# Patient Record
Sex: FEMALE | Race: WHITE | NOT HISPANIC OR LATINO | Employment: OTHER | URBAN - METROPOLITAN AREA
[De-identification: names, ages, dates, MRNs, and addresses within clinical notes are randomized per-mention and may not be internally consistent; named-entity substitution may affect disease eponyms.]

---

## 2023-01-23 ENCOUNTER — TELEPHONE (OUTPATIENT)
Dept: UROLOGY | Facility: CLINIC | Age: 83
End: 2023-01-23
Payer: MEDICARE

## 2023-01-23 NOTE — TELEPHONE ENCOUNTER
I spoke with pt daughter and scheduled pt appt. I will put a appointment slip in the mail as well      ----- Message from Renetta Gautam sent at 1/23/2023  4:38 PM CST -----  Type:  Sooner Apoointment Request    Caller is requesting a sooner appointment.  Caller declined first available appointment listed below.  Caller will not accept being placed on the waitlist and is requesting a message be sent to doctor.  Name of Caller: pt's daughter Vanessa Fontana  When is the first available appointment?sched for 5/17   Symptoms: recurrent UTI   Would the patient rather a call back or a response via [facility]? CALL   Best Call Back Number:[phone]  Additional Information: HAS HAD 3 BACTERIAL INFECTION AND MEDICINES ARE NOT WORKING MORGANELLA - MORGANELLA- II, KLEBSIELLA PNEUMONIAE, ENTEROCOCCUS FAECALIF. SHE IS RESISTANCE TO ANTIBIOTICS NOW SINCE SHE IS RECURRENT.

## 2023-02-13 ENCOUNTER — OFFICE VISIT (OUTPATIENT)
Dept: UROLOGY | Facility: CLINIC | Age: 83
End: 2023-02-13
Payer: MEDICARE

## 2023-02-13 VITALS
SYSTOLIC BLOOD PRESSURE: 159 MMHG | HEART RATE: 61 BPM | BODY MASS INDEX: 29.88 KG/M2 | DIASTOLIC BLOOD PRESSURE: 78 MMHG | HEIGHT: 63 IN | WEIGHT: 168.63 LBS

## 2023-02-13 DIAGNOSIS — Z87.440 HISTORY OF RECURRENT CYSTITIS: ICD-10-CM

## 2023-02-13 DIAGNOSIS — N39.0 RECURRENT UTI (URINARY TRACT INFECTION): Primary | ICD-10-CM

## 2023-02-13 DIAGNOSIS — N39.0 COMPLICATED UTI (URINARY TRACT INFECTION): ICD-10-CM

## 2023-02-13 DIAGNOSIS — Z01.818 PRE-OP TESTING: ICD-10-CM

## 2023-02-13 LAB
BILIRUB UR QL STRIP: NEGATIVE
CLARITY UR REFRACT.AUTO: CLEAR
COLOR UR AUTO: YELLOW
GLUCOSE UR QL STRIP: NEGATIVE
HGB UR QL STRIP: NEGATIVE
KETONES UR QL STRIP: NEGATIVE
LEUKOCYTE ESTERASE UR QL STRIP: NEGATIVE
NITRITE UR QL STRIP: NEGATIVE
PH UR STRIP: 7 [PH] (ref 5–8)
PROT UR QL STRIP: NEGATIVE
SP GR UR STRIP: 1.01 (ref 1–1.03)
URN SPEC COLLECT METH UR: NORMAL

## 2023-02-13 PROCEDURE — 3078F PR MOST RECENT DIASTOLIC BLOOD PRESSURE < 80 MM HG: ICD-10-PCS | Mod: CPTII,S$GLB,, | Performed by: NURSE PRACTITIONER

## 2023-02-13 PROCEDURE — 3288F FALL RISK ASSESSMENT DOCD: CPT | Mod: CPTII,S$GLB,, | Performed by: NURSE PRACTITIONER

## 2023-02-13 PROCEDURE — 87086 URINE CULTURE/COLONY COUNT: CPT | Performed by: NURSE PRACTITIONER

## 2023-02-13 PROCEDURE — 99999 PR PBB SHADOW E&M-EST. PATIENT-LVL IV: CPT | Mod: PBBFAC,,, | Performed by: NURSE PRACTITIONER

## 2023-02-13 PROCEDURE — 1159F PR MEDICATION LIST DOCUMENTED IN MEDICAL RECORD: ICD-10-PCS | Mod: CPTII,S$GLB,, | Performed by: NURSE PRACTITIONER

## 2023-02-13 PROCEDURE — 3077F SYST BP >= 140 MM HG: CPT | Mod: CPTII,S$GLB,, | Performed by: NURSE PRACTITIONER

## 2023-02-13 PROCEDURE — 81003 URINALYSIS AUTO W/O SCOPE: CPT | Performed by: NURSE PRACTITIONER

## 2023-02-13 PROCEDURE — 3077F PR MOST RECENT SYSTOLIC BLOOD PRESSURE >= 140 MM HG: ICD-10-PCS | Mod: CPTII,S$GLB,, | Performed by: NURSE PRACTITIONER

## 2023-02-13 PROCEDURE — 99204 OFFICE O/P NEW MOD 45 MIN: CPT | Mod: S$GLB,,, | Performed by: NURSE PRACTITIONER

## 2023-02-13 PROCEDURE — 99204 PR OFFICE/OUTPT VISIT, NEW, LEVL IV, 45-59 MIN: ICD-10-PCS | Mod: S$GLB,,, | Performed by: NURSE PRACTITIONER

## 2023-02-13 PROCEDURE — 1125F PR PAIN SEVERITY QUANTIFIED, PAIN PRESENT: ICD-10-PCS | Mod: CPTII,S$GLB,, | Performed by: NURSE PRACTITIONER

## 2023-02-13 PROCEDURE — 1101F PT FALLS ASSESS-DOCD LE1/YR: CPT | Mod: CPTII,S$GLB,, | Performed by: NURSE PRACTITIONER

## 2023-02-13 PROCEDURE — 3288F PR FALLS RISK ASSESSMENT DOCUMENTED: ICD-10-PCS | Mod: CPTII,S$GLB,, | Performed by: NURSE PRACTITIONER

## 2023-02-13 PROCEDURE — 1101F PR PT FALLS ASSESS DOC 0-1 FALLS W/OUT INJ PAST YR: ICD-10-PCS | Mod: CPTII,S$GLB,, | Performed by: NURSE PRACTITIONER

## 2023-02-13 PROCEDURE — 1160F PR REVIEW ALL MEDS BY PRESCRIBER/CLIN PHARMACIST DOCUMENTED: ICD-10-PCS | Mod: CPTII,S$GLB,, | Performed by: NURSE PRACTITIONER

## 2023-02-13 PROCEDURE — 3078F DIAST BP <80 MM HG: CPT | Mod: CPTII,S$GLB,, | Performed by: NURSE PRACTITIONER

## 2023-02-13 PROCEDURE — 99999 PR PBB SHADOW E&M-EST. PATIENT-LVL IV: ICD-10-PCS | Mod: PBBFAC,,, | Performed by: NURSE PRACTITIONER

## 2023-02-13 PROCEDURE — 1160F RVW MEDS BY RX/DR IN RCRD: CPT | Mod: CPTII,S$GLB,, | Performed by: NURSE PRACTITIONER

## 2023-02-13 PROCEDURE — 1125F AMNT PAIN NOTED PAIN PRSNT: CPT | Mod: CPTII,S$GLB,, | Performed by: NURSE PRACTITIONER

## 2023-02-13 PROCEDURE — 1159F MED LIST DOCD IN RCRD: CPT | Mod: CPTII,S$GLB,, | Performed by: NURSE PRACTITIONER

## 2023-02-13 RX ORDER — OXYBUTYNIN CHLORIDE 15 MG/1
TABLET, EXTENDED RELEASE ORAL
COMMUNITY
End: 2023-02-17

## 2023-02-13 RX ORDER — CELECOXIB 100 MG/1
100 CAPSULE ORAL DAILY
COMMUNITY

## 2023-02-13 RX ORDER — LOSARTAN POTASSIUM 50 MG/1
100 TABLET ORAL DAILY
COMMUNITY
End: 2023-04-04 | Stop reason: SDUPTHER

## 2023-02-13 RX ORDER — METOPROLOL SUCCINATE 25 MG/1
25 TABLET, EXTENDED RELEASE ORAL 2 TIMES DAILY
COMMUNITY
End: 2023-03-13

## 2023-02-13 RX ORDER — ALBUTEROL SULFATE 90 UG/1
AEROSOL, METERED RESPIRATORY (INHALATION)
COMMUNITY
End: 2023-02-22

## 2023-02-13 RX ORDER — NITROFURANTOIN MACROCRYSTALS 50 MG/1
50 CAPSULE ORAL NIGHTLY
Status: ON HOLD | COMMUNITY
End: 2023-03-02 | Stop reason: HOSPADM

## 2023-02-13 RX ORDER — PANTOPRAZOLE SODIUM 40 MG/1
40 TABLET, DELAYED RELEASE ORAL DAILY
COMMUNITY

## 2023-02-13 NOTE — PATIENT INSTRUCTIONS
Schedule patient for cysto with possible bladder fulguration by Dr. Walton for history of recurrent UTIs.   Pre-op labs needed (u/a , urine cx, CBC, BMP).  Continue to avoid the use of blood thinners for 7-10 days prior to surgery to reduce risk of bleeding if bladder fulguration is needed.   U/S retroperitoneal complete (STAT)  Follow-up post-op.

## 2023-02-13 NOTE — PROGRESS NOTES
Subjective:       Patient ID: Ricky Hassan is a 82 y.o. female.    Chief Complaint: Urinary Tract Infection    Patient is new to me. She is a 83 yo WF who is here today for evaluation of recurrent UTIs. Patient reports having this problem for years, but it has worsen the last 3 months. She reports having a persistent UTI that is not responding to antibiotic therapy. Patient is currently taking fosfomycin (has taken two packs and has 1 pack left). She is also on maintenance antibiotic therapy (macrodantin 50 mg). Patient is here today with her daughter (Alexus).    Urinary Tract Infection   This is a chronic problem. The current episode started more than 1 month ago. The problem has been unchanged. The quality of the pain is described as aching (bladder pain). The pain is at a severity of 6/10. The pain is moderate. There has been no fever. There is No history of pyelonephritis. Associated symptoms include frequency. Pertinent negatives include no behavior changes, chills, discharge, flank pain, hematuria, hesitancy, nausea, possible pregnancy, sweats, urgency, vomiting, weight loss, bubble bath use or withholding. She has tried antibiotics for the symptoms. The treatment provided mild relief. Her past medical history is significant for hypertension and recurrent UTIs. There is no history of diabetes mellitus, kidney stones, a single kidney or a urological procedure.   Review of Systems   Constitutional:  Negative for chills, fatigue, fever and weight loss.   Gastrointestinal:  Negative for nausea and vomiting.   Genitourinary:  Positive for frequency, nocturia (x4) and pelvic pain (bladder pain). Negative for decreased urine volume, difficulty urinating, dysuria, flank pain, hematuria, hesitancy, urgency, vaginal bleeding, vaginal discharge and vaginal pain.   Neurological:  Negative for dizziness, weakness, light-headedness and headaches.   Psychiatric/Behavioral: Negative.         Objective:      Physical  Exam  Vitals and nursing note reviewed.   Constitutional:       Appearance: She is well-developed.   HENT:      Head: Normocephalic and atraumatic.   Eyes:      Pupils: Pupils are equal, round, and reactive to light.   Cardiovascular:      Rate and Rhythm: Normal rate.   Pulmonary:      Effort: Pulmonary effort is normal. No respiratory distress.   Abdominal:      Palpations: Abdomen is soft.      Tenderness: There is no abdominal tenderness.   Musculoskeletal:         General: Normal range of motion.      Cervical back: Normal range of motion.   Skin:     General: Skin is warm and dry.   Neurological:      Mental Status: She is alert and oriented to person, place, and time.      Coordination: Coordination normal.   Psychiatric:         Mood and Affect: Mood normal.         Behavior: Behavior normal.         Thought Content: Thought content normal.         Judgment: Judgment normal.       Assessment:       Problem List Items Addressed This Visit    None  Visit Diagnoses       Recurrent UTI (urinary tract infection)    -  Primary    Relevant Orders    US Retroperitoneal Complete    Urine culture    Urinalysis    Complicated UTI (urinary tract infection)        Relevant Orders    US Retroperitoneal Complete    Urine culture    Urinalysis    History of recurrent cystitis        Relevant Orders    US Retroperitoneal Complete    Urine culture    Urinalysis    Pre-op testing        Relevant Orders    CBC Auto Differential    Basic Metabolic Panel            Plan:           Ricky was seen today for urinary tract infection.    Diagnoses and all orders for this visit:    Recurrent UTI (urinary tract infection)  -     US Retroperitoneal Complete; Future  -     Urine culture  -     Urinalysis    Complicated UTI (urinary tract infection)  -     US Retroperitoneal Complete; Future  -     Urine culture  -     Urinalysis    History of recurrent cystitis  -     US Retroperitoneal Complete; Future  -     Urine culture  -      Urinalysis    Pre-op testing  -     CBC Auto Differential; Future  -     Basic Metabolic Panel; Future    Other orders  Schedule patient for cysto with possible bladder fulguration by Dr. Walton for history of recurrent UTIs.   Pre-op labs needed (u/a , urine cx, CBC, BMP).  Continue to avoid the use of blood thinners for 7-10 days prior to surgery to reduce risk of bleeding if bladder fulguration is needed.     Follow-up post-op.     Lisa Dorantes NP

## 2023-02-14 ENCOUNTER — TELEPHONE (OUTPATIENT)
Dept: UROLOGY | Facility: CLINIC | Age: 83
End: 2023-02-14
Payer: MEDICARE

## 2023-02-14 LAB — BACTERIA UR CULT: NO GROWTH

## 2023-02-14 NOTE — TELEPHONE ENCOUNTER
----- Message from Eliel Ivan sent at 2/14/2023 10:55 AM CST -----  .Type:  Procedure    Who Called: Pt  Would the patient rather a call back or a response via [facility]? Call  Best Call Back Number: [phone]  Additional Information:   Pt would like to March 2nd if possible, for procedure.

## 2023-02-16 DIAGNOSIS — Z87.440 HISTORY OF RECURRENT UTIS: ICD-10-CM

## 2023-02-16 DIAGNOSIS — Z87.440 HISTORY OF RECURRENT CYSTITIS: Primary | ICD-10-CM

## 2023-02-16 DIAGNOSIS — N39.0 RECURRENT UTI (URINARY TRACT INFECTION): ICD-10-CM

## 2023-02-16 RX ORDER — SODIUM CHLORIDE 9 MG/ML
INJECTION, SOLUTION INTRAVENOUS CONTINUOUS
Status: CANCELLED | OUTPATIENT
Start: 2023-02-16

## 2023-02-16 RX ORDER — LIDOCAINE HYDROCHLORIDE 20 MG/ML
JELLY TOPICAL ONCE
Status: CANCELLED | OUTPATIENT
Start: 2023-02-16 | End: 2023-02-16

## 2023-02-17 ENCOUNTER — TELEPHONE (OUTPATIENT)
Dept: UROLOGY | Facility: CLINIC | Age: 83
End: 2023-02-17
Payer: MEDICARE

## 2023-02-17 DIAGNOSIS — N20.0 LEFT RENAL STONE: Primary | ICD-10-CM

## 2023-02-17 RX ORDER — TAMSULOSIN HYDROCHLORIDE 0.4 MG/1
0.4 CAPSULE ORAL DAILY
Qty: 30 CAPSULE | Refills: 2 | Status: SHIPPED | OUTPATIENT
Start: 2023-02-17 | End: 2023-07-20

## 2023-02-17 NOTE — TELEPHONE ENCOUNTER
Spoke with pt daughter, informed of note.    ----- Message from Lisa Dorantes NP sent at 2/17/2023  3:49 PM CST -----  Please inform patient's daughter/caregiver that patient's urine cx was normal.

## 2023-02-17 NOTE — TELEPHONE ENCOUNTER
I spoke with daughter, informed.    ----- Message from Lisa Dorantes NP sent at 2/17/2023  3:55 PM CST -----  Please inform patient's daughter/caregiver via telephone that patient has a small non-obstructing left renal stone that was noted on her U/S. I would like patient to start trial of tamsulosin. Script sent to [facility].

## 2023-02-20 ENCOUNTER — TELEPHONE (OUTPATIENT)
Dept: UROLOGY | Facility: CLINIC | Age: 83
End: 2023-02-20
Payer: MEDICARE

## 2023-02-20 NOTE — TELEPHONE ENCOUNTER
Spoke to pts daughter she will call today to see if she can get an appointment this week for cardio clearance and echo and let us know if they will be able to fit them in or not this week.

## 2023-02-20 NOTE — TELEPHONE ENCOUNTER
Called [facility] [phone].I called and she hasn't been seen since march of 2023 I have them faxing the progress note for that to [facility]. That includes an EKG but there was no echo done that she can see. If pt needs clearance she will have to be seen by them prior to procedure.

## 2023-02-22 ENCOUNTER — TELEPHONE (OUTPATIENT)
Dept: CARDIOLOGY | Facility: CLINIC | Age: 83
End: 2023-02-22

## 2023-02-22 ENCOUNTER — OFFICE VISIT (OUTPATIENT)
Dept: CARDIOLOGY | Facility: CLINIC | Age: 83
End: 2023-02-22
Payer: MEDICARE

## 2023-02-22 ENCOUNTER — HOSPITAL ENCOUNTER (OUTPATIENT)
Dept: PULMONOLOGY | Facility: HOSPITAL | Age: 83
Discharge: HOME | End: 2023-02-22
Attending: INTERNAL MEDICINE
Payer: MEDICARE

## 2023-02-22 VITALS
SYSTOLIC BLOOD PRESSURE: 122 MMHG | DIASTOLIC BLOOD PRESSURE: 68 MMHG | BODY MASS INDEX: 29.61 KG/M2 | RESPIRATION RATE: 14 BRPM | HEART RATE: 58 BPM | WEIGHT: 167.13 LBS | OXYGEN SATURATION: 98 % | HEIGHT: 63 IN

## 2023-02-22 DIAGNOSIS — E78.5 HYPERLIPIDEMIA, UNSPECIFIED HYPERLIPIDEMIA TYPE: ICD-10-CM

## 2023-02-22 DIAGNOSIS — R01.1 UNDIAGNOSED CARDIAC MURMURS: Primary | ICD-10-CM

## 2023-02-22 DIAGNOSIS — I10 PRIMARY HYPERTENSION: ICD-10-CM

## 2023-02-22 DIAGNOSIS — R94.31 ABNORMAL ELECTROCARDIOGRAM: ICD-10-CM

## 2023-02-22 DIAGNOSIS — I35.1 AORTIC EJECTION MURMUR: ICD-10-CM

## 2023-02-22 DIAGNOSIS — I45.10 RBBB: ICD-10-CM

## 2023-02-22 DIAGNOSIS — Z01.818 PREOPERATIVE CLEARANCE: ICD-10-CM

## 2023-02-22 DIAGNOSIS — R01.1 UNDIAGNOSED CARDIAC MURMURS: ICD-10-CM

## 2023-02-22 LAB
ASCENDING AORTA: 2.61 CM
AV INDEX (PROSTH): 0.91
AV MEAN GRADIENT: 5 MMHG
AV PEAK GRADIENT: 8 MMHG
AV VALVE AREA: 1.98 CM2
AV VELOCITY RATIO: 0.88
BSA FOR ECHO PROCEDURE: 1.84 M2
CV ECHO LV RWT: 0.47 CM
DOP CALC AO PEAK VEL: 1.45 M/S
DOP CALC AO VTI: 35.3 CM
DOP CALC LVOT AREA: 2.2 CM2
DOP CALC LVOT DIAMETER: 1.67 CM
DOP CALC LVOT PEAK VEL: 1.28 M/S
DOP CALC LVOT STROKE VOLUME: 70.06 CM3
DOP CALC RVOT PEAK VEL: 1.05 M/S
DOP CALC RVOT VTI: 23.6 CM
DOP CALCLVOT PEAK VEL VTI: 32 CM
E WAVE DECELERATION TIME: 298.93 MSEC
E/A RATIO: 0.69
E/E' RATIO: 14.8 M/S
ECHO LV POSTERIOR WALL: 1.07 CM (ref 0.6–1.1)
EJECTION FRACTION: 60 %
FRACTIONAL SHORTENING: 36 % (ref 28–44)
INTERVENTRICULAR SEPTUM: 1.04 CM (ref 0.6–1.1)
IVC DIAMETER: 14 CM
IVRT: 133.21 MSEC
LA MAJOR: 4.92 CM
LA MINOR: 5.02 CM
LA WIDTH: 2.6 CM
LEFT ATRIUM SIZE: 3.15 CM
LEFT ATRIUM VOLUME INDEX MOD: 21.2 ML/M2
LEFT ATRIUM VOLUME INDEX: 19.3 ML/M2
LEFT ATRIUM VOLUME MOD: 37.98 CM3
LEFT ATRIUM VOLUME: 34.6 CM3
LEFT INTERNAL DIMENSION IN SYSTOLE: 2.91 CM (ref 2.1–4)
LEFT VENTRICLE DIASTOLIC VOLUME INDEX: 52.09 ML/M2
LEFT VENTRICLE DIASTOLIC VOLUME: 93.24 ML
LEFT VENTRICLE MASS INDEX: 93 G/M2
LEFT VENTRICLE SYSTOLIC VOLUME INDEX: 18.2 ML/M2
LEFT VENTRICLE SYSTOLIC VOLUME: 32.57 ML
LEFT VENTRICULAR INTERNAL DIMENSION IN DIASTOLE: 4.52 CM (ref 3.5–6)
LEFT VENTRICULAR MASS: 166.24 G
LV LATERAL E/E' RATIO: 14.8 M/S
LV SEPTAL E/E' RATIO: 14.8 M/S
LVOT MG: 4.05 MMHG
LVOT MV: 0.97 CM/S
MV PEAK A VEL: 1.07 M/S
MV PEAK E VEL: 0.74 M/S
MV STENOSIS PRESSURE HALF TIME: 86.69 MS
MV VALVE AREA P 1/2 METHOD: 2.54 CM2
PISA TR MAX VEL: 1.9 M/S
PULM VEIN S/D RATIO: 1.65
PV MEAN GRADIENT: 2.75 MMHG
PV MV: 0.89 M/S
PV PEAK D VEL: 0.26 M/S
PV PEAK S VEL: 0.43 M/S
PV PEAK VELOCITY: 1.15 CM/S
RA MAJOR: 4.52 CM
RA PRESSURE: 8 MMHG
RA WIDTH: 3.35 CM
RIGHT VENTRICULAR END-DIASTOLIC DIMENSION: 2.82 CM
SINUS: 2.73 CM
STJ: 2.69 CM
TDI LATERAL: 0.05 M/S
TDI SEPTAL: 0.05 M/S
TDI: 0.05 M/S
TR MAX PG: 14 MMHG
TV REST PULMONARY ARTERY PRESSURE: 22 MMHG

## 2023-02-22 PROCEDURE — 3078F DIAST BP <80 MM HG: CPT | Mod: CPTII,S$GLB,, | Performed by: INTERNAL MEDICINE

## 2023-02-22 PROCEDURE — 99999 PR PBB SHADOW E&M-EST. PATIENT-LVL III: CPT | Mod: PBBFAC,,, | Performed by: INTERNAL MEDICINE

## 2023-02-22 PROCEDURE — 93306 ECHO (CUPID ONLY): ICD-10-PCS | Mod: 26,,, | Performed by: INTERNAL MEDICINE

## 2023-02-22 PROCEDURE — 99204 OFFICE O/P NEW MOD 45 MIN: CPT | Mod: 25,S$GLB,, | Performed by: INTERNAL MEDICINE

## 2023-02-22 PROCEDURE — 1126F AMNT PAIN NOTED NONE PRSNT: CPT | Mod: CPTII,S$GLB,, | Performed by: INTERNAL MEDICINE

## 2023-02-22 PROCEDURE — 3078F PR MOST RECENT DIASTOLIC BLOOD PRESSURE < 80 MM HG: ICD-10-PCS | Mod: CPTII,S$GLB,, | Performed by: INTERNAL MEDICINE

## 2023-02-22 PROCEDURE — 93005 ELECTROCARDIOGRAM TRACING: CPT

## 2023-02-22 PROCEDURE — 93306 TTE W/DOPPLER COMPLETE: CPT | Mod: 26,,, | Performed by: INTERNAL MEDICINE

## 2023-02-22 PROCEDURE — 93010 EKG 12-LEAD: ICD-10-PCS | Mod: S$GLB,,, | Performed by: INTERNAL MEDICINE

## 2023-02-22 PROCEDURE — 1126F PR PAIN SEVERITY QUANTIFIED, NO PAIN PRESENT: ICD-10-PCS | Mod: CPTII,S$GLB,, | Performed by: INTERNAL MEDICINE

## 2023-02-22 PROCEDURE — 93306 TTE W/DOPPLER COMPLETE: CPT

## 2023-02-22 PROCEDURE — 3074F PR MOST RECENT SYSTOLIC BLOOD PRESSURE < 130 MM HG: ICD-10-PCS | Mod: CPTII,S$GLB,, | Performed by: INTERNAL MEDICINE

## 2023-02-22 PROCEDURE — 99204 PR OFFICE/OUTPT VISIT, NEW, LEVL IV, 45-59 MIN: ICD-10-PCS | Mod: 25,S$GLB,, | Performed by: INTERNAL MEDICINE

## 2023-02-22 PROCEDURE — 93010 ELECTROCARDIOGRAM REPORT: CPT | Mod: S$GLB,,, | Performed by: INTERNAL MEDICINE

## 2023-02-22 PROCEDURE — 99999 PR PBB SHADOW E&M-EST. PATIENT-LVL III: ICD-10-PCS | Mod: PBBFAC,,, | Performed by: INTERNAL MEDICINE

## 2023-02-22 PROCEDURE — 3074F SYST BP LT 130 MM HG: CPT | Mod: CPTII,S$GLB,, | Performed by: INTERNAL MEDICINE

## 2023-02-22 NOTE — PROGRESS NOTES
[facility]     Subjective:    Patient ID:  Ricky Hassan is a 82 y.o. female who presents for evaluation of Pre-op Exam (Referral from Dr Reed Walton), Hypertension, Abnormal ECG, and Hyperlipidemia    Review of patient's allergies indicates:   Allergen Reactions    Cipro [ciprofloxacin hcl] Itching    Vicodin [hydrocodone-acetaminophen] Hives      She is here to establish care.  She needs preoperative clearance for upcoming cystoscopy and management of possible kidney stone.  She has had recurrent urinary tract infections repeatedly in the recent past requiring parenteral and oral antibiotics.  She is never had sepsis.  Her kidney ultrasound study showed possible kidney stone.  A cystoscopy is planned.    She has had bilateral knee replacements in the past.  Her last nuclear stress test was normal, 2020 by report only.  She has hypertension and hyperlipidemia.  She is not on statin therapy due to intolerance.  Her blood pressures have been controlled with metoprolol and losartan.  She followed with [facility], her cardiologist left the group.  Have been able to review a note from March 2021.    Patient has right bundle branch block and left anterior fascicular block on Electrocardiogram.  Her heart rate today is 56 beats per minute.  She does not check her blood pressures at home.  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  She does not do a lot of exercise on a regular basis but denies shortness of breath or chest pain.  She tolerates metoprolol well and has been on it for years.  Her surgical procedure is March 2, 2023.    The patient's electrocardiogram reviewed and independently interpreted by myself today confirms heart rate 56 with right bundle branch block and findings of left anterior fascicular block.  She had recent chemistries and CBC which I have reviewed and look acceptably normal.      Review of Systems   Constitutional:  Negative for chills, decreased appetite, diaphoresis, fever, malaise/fatigue, night sweats, weight gain and weight loss.   HENT:  Negative for congestion, ear discharge, ear pain, hearing loss, hoarse voice, nosebleeds, odynophagia, sore throat, stridor and tinnitus.    Eyes:  Negative for blurred vision, discharge, double vision, pain, photophobia, redness, vision loss in left eye, vision loss in right eye, visual disturbance and visual halos.   Cardiovascular:  Negative for chest pain, claudication, cyanosis, dyspnea on exertion, irregular heartbeat, leg swelling, near-syncope, orthopnea, palpitations, paroxysmal nocturnal dyspnea and syncope.   Respiratory:  Negative for cough, hemoptysis, shortness of breath, sleep disturbances due to breathing, snoring, sputum production and wheezing.    Endocrine: Negative for cold intolerance, heat intolerance, polydipsia, polyphagia and polyuria.   Hematologic/Lymphatic: Negative for adenopathy and bleeding problem. Does not bruise/bleed easily.   Skin:  Negative for color change, dry skin, flushing, itching, nail changes, poor wound healing, rash, skin cancer, suspicious lesions and unusual hair distribution.   Musculoskeletal:  Negative for arthritis, back pain, falls, gout, joint pain, joint swelling, muscle cramps, muscle weakness, myalgias, neck pain and stiffness.   Gastrointestinal:  Negative for bloating, abdominal pain, anorexia, change in bowel habit, bowel incontinence, constipation, diarrhea, dysphagia, excessive appetite, flatus, heartburn, hematemesis, hematochezia, hemorrhoids, jaundice, melena, nausea and vomiting.   Genitourinary:  Negative for bladder incontinence, decreased libido, dysuria, flank pain, frequency, genital sores, hematuria, hesitancy, incomplete emptying, nocturia and urgency.   Neurological:  Negative for aphonia, brief paralysis, difficulty with concentration, disturbances in coordination, excessive daytime sleepiness, dizziness, focal  "weakness, headaches, light-headedness, loss of balance, numbness, paresthesias, seizures, sensory change, tremors, vertigo and weakness.   Psychiatric/Behavioral:  Negative for altered mental status, depression, hallucinations, memory loss, substance abuse, suicidal ideas and thoughts of violence. The patient does not have insomnia and is not nervous/anxious.    Allergic/Immunologic: Negative for hives and persistent infections.      Objective:       Vitals:    02/22/23 1024   BP: 122/68   BP Location: Right arm   Patient Position: Sitting   BP Method: Medium (Manual)   Pulse: (!) 58   Resp: 14   SpO2: 98%   Weight: 75.8 kg (167 lb 1.7 oz)   Height: 5' 3" (1.6 m)    Physical Exam  Constitutional:       General: She is not in acute distress.     Appearance: She is well-developed. She is not diaphoretic.   HENT:      Head: Normocephalic and atraumatic.      Nose: Nose normal.   Eyes:      General: No scleral icterus.        Right eye: No discharge.      Conjunctiva/sclera: Conjunctivae normal.      Pupils: Pupils are equal, round, and reactive to light.   Neck:      Thyroid: No thyromegaly.      Vascular: No JVD.      Trachea: No tracheal deviation.   Cardiovascular:      Rate and Rhythm: Normal rate and regular rhythm.      Pulses:           Carotid pulses are 2+ on the right side and 2+ on the left side.       Radial pulses are 2+ on the right side and 2+ on the left side.        Dorsalis pedis pulses are 2+ on the right side and 2+ on the left side.        Posterior tibial pulses are 2+ on the right side and 2+ on the left side.      Heart sounds: Murmur heard.   Harsh early systolic murmur is present with a grade of 2/6 at the upper right sternal border.     No friction rub. No gallop.   Pulmonary:      Effort: Pulmonary effort is normal. No respiratory distress.      Breath sounds: Normal breath sounds. No stridor. No wheezing or rales.   Chest:      Chest wall: No tenderness.   Abdominal:      General: Bowel " sounds are normal. There is no distension.      Palpations: Abdomen is soft. There is no mass.      Tenderness: There is no abdominal tenderness. There is no guarding or rebound.   Musculoskeletal:         General: No tenderness. Normal range of motion.      Cervical back: Normal range of motion and neck supple.   Lymphadenopathy:      Cervical: No cervical adenopathy.   Skin:     General: Skin is warm and dry.      Coloration: Skin is not pale.      Findings: No erythema or rash.   Neurological:      Mental Status: She is alert and oriented to person, place, and time.      Cranial Nerves: No cranial nerve deficit.      Coordination: Coordination normal.   Psychiatric:         Behavior: Behavior normal.         Thought Content: Thought content normal.         Judgment: Judgment normal.         Assessment:       1. Undiagnosed cardiac murmurs    2. RBBB    3. Hyperlipidemia, unspecified hyperlipidemia type    4. Primary hypertension    5. Abnormal electrocardiogram    6. Preoperative clearance    7. Aortic ejection murmur      Results for orders placed or performed in visit on 02/13/23   CBC Auto Differential   Result Value Ref Range    WBC 7.85 3.90 - 12.70 K/uL    RBC 4.64 4.00 - 5.40 M/uL    Hemoglobin 14.2 12.0 - 16.0 g/dL    Hematocrit 42.1 37.0 - 48.5 %    MCV 91 82 - 98 fL    MCH 30.6 27.0 - 31.0 pg    MCHC 33.7 32.0 - 36.0 g/dL    RDW 13.6 11.5 - 14.5 %    Platelets 278 150 - 450 K/uL    MPV 11.1 9.2 - 12.9 fL    Immature Granulocytes 0.1 0.0 - 0.5 %    Gran # (ANC) 5.0 1.8 - 7.7 K/uL    Immature Grans (Abs) 0.01 0.00 - 0.04 K/uL    Lymph # 1.9 1.0 - 4.8 K/uL    Mono # 0.6 0.3 - 1.0 K/uL    Eos # 0.3 0.0 - 0.5 K/uL    Baso # 0.08 0.00 - 0.20 K/uL    nRBC 0 0 /100 WBC    Gran % 63.6 38.0 - 73.0 %    Lymph % 24.1 18.0 - 48.0 %    Mono % 8.0 4.0 - 15.0 %    Eosinophil % 3.2 0.0 - 8.0 %    Basophil % 1.0 0.0 - 1.9 %    Differential Method Automated    Basic Metabolic Panel   Result Value Ref Range    Sodium 139  136 - 145 mmol/L    Potassium 4.3 3.5 - 5.1 mmol/L    Chloride 103 95 - 110 mmol/L    CO2 29 23 - 29 mmol/L    Glucose 97 70 - 110 mg/dL    BUN 19 (H) 7 - 17 mg/dL    Creatinine 0.73 0.50 - 1.40 mg/dL    Calcium 9.2 8.7 - 10.5 mg/dL    Anion Gap 7 (L) 8 - 16 mmol/L    eGFR >60.0 >60 mL/min/1.73 m^2         Current Outpatient Medications:     celecoxib (CELEBREX) 100 MG capsule, Take 100 mg by mouth once daily., Disp: , Rfl:     losartan (COZAAR) 50 MG tablet, Take 50 mg by mouth once daily., Disp: , Rfl:     metoprolol succinate (TOPROL-XL) 25 MG 24 hr tablet, Take 25 mg by mouth 2 (two) times daily., Disp: , Rfl:     nitrofurantoin (MACRODANTIN) 50 MG capsule, Take 50 mg by mouth every evening., Disp: , Rfl:     pantoprazole (PROTONIX) 40 MG tablet, Take 40 mg by mouth once daily., Disp: , Rfl:     tamsulosin (FLOMAX) 0.4 mg Cap, Take 1 capsule (0.4 mg total) by mouth once daily., Disp: 30 capsule, Rfl: 2     Lab Results   Component Value Date    WBC 7.85 02/13/2023    RBC 4.64 02/13/2023    HGB 14.2 02/13/2023    HCT 42.1 02/13/2023    MCV 91 02/13/2023    MCH 30.6 02/13/2023    MCHC 33.7 02/13/2023    RDW 13.6 02/13/2023     02/13/2023    MPV 11.1 02/13/2023    GRAN 5.0 02/13/2023    GRAN 63.6 02/13/2023    LYMPH 1.9 02/13/2023    LYMPH 24.1 02/13/2023    MONO 0.6 02/13/2023    MONO 8.0 02/13/2023    EOS 0.3 02/13/2023    BASO 0.08 02/13/2023    EOSINOPHIL 3.2 02/13/2023    BASOPHIL 1.0 02/13/2023        CMP  Lab Results   Component Value Date     02/13/2023    K 4.3 02/13/2023     02/13/2023    CO2 29 02/13/2023    GLU 97 02/13/2023    BUN 19 (H) 02/13/2023    CREATININE 0.73 02/13/2023    CALCIUM 9.2 02/13/2023    ANIONGAP 7 (L) 02/13/2023        Lab Results   Component Value Date    LABURIN No growth 02/13/2023            Results for orders placed or performed in visit on 02/22/23   EKG 12-lead    Collection Time: 02/22/23 10:18 AM    Narrative    Test Reason : R01.1,I45.10,    Vent. Rate :  056 BPM     Atrial Rate : 056 BPM     P-R Int : 194 ms          QRS Dur : 134 ms      QT Int : 428 ms       P-R-T Axes : 038 -57 019 degrees     QTc Int : 413 ms    Sinus bradycardia  Right bundle branch block  Left anterior fascicular block   Bifascicular block   Abnormal ECG  No previous ECGs available  Confirmed by Edin AGUILAR, Remberto HOOD ([id number]) on 2/22/2023 1:36:22 PM    Referred By: [email or web address]   SELF           Confirmed By:Remberto Jesus MD                  Plan:       Problem List Items Addressed This Visit          Cardiac/Vascular    Hyperlipidemia     She was intolerant to a statin in the past with leg weakness.  No labs available for review currently.  It will be readdressed on next visit.    Given her clinical status and age, it is unlikely that statin therapy will be initiated.         Primary hypertension     She will continue losartan, metoprolol.  Her readings have been stable.  Her ejection fraction is normal by echo with LVH.         Abnormal electrocardiogram     Right bundle branch block and left anterior fascicular block confirmed.  She has mild sinus bradycardia related to metoprolol.  No medication changes advised preoperatively.         Aortic ejection murmur     Echo reviewed, in significant aortic valve disease.  Her aortic valve demonstrates normal leaflet excursion.            Other    Preoperative clearance     I have cleared the patient for upcoming urologic surgical procedure.  She has normal ejection fraction on echo.  He had a negative stress test in 2020 by report.  She has no complaints of shortness of breath or angina.          Other Visit Diagnoses       Undiagnosed cardiac murmurs    -  Primary    Relevant Orders    Echo (Completed)    EKG 12-lead (Completed)    RBBB        Relevant Orders    EKG 12-lead (Completed)                 A 1 month follow-up was advised.  She has a heart murmur, echocardiogram ordered.  We discussed withdrawal of beta-blocker therapy in the future given her  age and conduction disease on Electrocardiogram      Her echocardiogram has been reviewed and I have provided surgical clearance from a cardiac standpoint.    Thank you for allowing me to participate in your patient's care.             Remberto Jesus MD  02/22/2023   11:15 AM

## 2023-02-22 NOTE — ASSESSMENT & PLAN NOTE
She will continue losartan, metoprolol.  Her readings have been stable.  Her ejection fraction is normal by echo with LVH.

## 2023-02-22 NOTE — ASSESSMENT & PLAN NOTE
I have cleared the patient for upcoming urologic surgical procedure.  She has normal ejection fraction on echo.  He had a negative stress test in 2020 by report.  She has no complaints of shortness of breath or angina.

## 2023-02-22 NOTE — ASSESSMENT & PLAN NOTE
Echo reviewed, in significant aortic valve disease.  Her aortic valve demonstrates normal leaflet excursion.

## 2023-02-22 NOTE — ASSESSMENT & PLAN NOTE
Right bundle branch block and left anterior fascicular block confirmed.  She has mild sinus bradycardia related to metoprolol.  No medication changes advised preoperatively.

## 2023-02-22 NOTE — ASSESSMENT & PLAN NOTE
She was intolerant to a statin in the past with leg weakness.  No labs available for review currently.  It will be readdressed on next visit.    Given her clinical status and age, it is unlikely that statin therapy will be initiated.

## 2023-03-02 PROBLEM — Z87.440 HISTORY OF RECURRENT CYSTITIS: Status: ACTIVE | Noted: 2023-03-02

## 2023-03-02 PROBLEM — N39.0 RECURRENT UTI (URINARY TRACT INFECTION): Status: ACTIVE | Noted: 2023-03-02

## 2023-03-02 PROBLEM — Z87.440 HISTORY OF RECURRENT UTIS: Status: ACTIVE | Noted: 2023-03-02

## 2023-03-03 ENCOUNTER — TELEPHONE (OUTPATIENT)
Dept: UROLOGY | Facility: CLINIC | Age: 83
End: 2023-03-03
Payer: MEDICARE

## 2023-03-03 RX ORDER — CEPHALEXIN 500 MG/1
500 CAPSULE ORAL 4 TIMES DAILY
Qty: 56 CAPSULE | Refills: 1 | Status: SHIPPED | OUTPATIENT
Start: 2023-03-03 | End: 2023-03-17

## 2023-03-03 NOTE — TELEPHONE ENCOUNTER
I spoke with pt and informed her to stop the bactrim and take benadryl tonight per Dr Walton and he will send her a new antibiotic to her pharmacy to start tomorrow.

## 2023-03-03 NOTE — TELEPHONE ENCOUNTER
----- Message from Carmina Michele sent at 3/3/2023 11:29 AM CST -----  Regarding: call back  Contact: [phone] or [phone]  Type:  Needs Medical Advice    Who Called: PT   Symptoms (please be specific): Hives or Rash   How long has patient had these symptoms:  this morning   Pharmacy name and phone #:  [id number] - [place], [place] - [address] Phone:[phone] Fax:[phone]  Would the patient rather a call back or a response via [facility]? Call back   Best Call Back Number:  [phone]  Additional Information:

## 2023-03-03 NOTE — TELEPHONE ENCOUNTER
Staff message sent to Magui:    Pt states one out of the three medications is causing her to break out in a rash all over her arms and legs, she doesn't know which one. She called the pharmacy and they directed her to call us. How would you like me to advise?      ----- Message from Florence Sheriff RN sent at 3/3/2023  1:31 PM CST -----  Regarding: call back patient  During Mrs. Hassan post op call she states that she had broken out in a rash after taking her medications and is requesting a call back from the office or Dr. Walton. Here is her main location she said for now until [facility] [phone].     Alt numbers: [phone]    Thank you,   Florence Sheriff Rn

## 2023-03-07 ENCOUNTER — HOSPITAL ENCOUNTER (EMERGENCY)
Facility: HOSPITAL | Age: 83
Discharge: HOME | End: 2023-03-07
Attending: SURGERY
Payer: MEDICARE

## 2023-03-07 DIAGNOSIS — K59.00 CONSTIPATION, UNSPECIFIED CONSTIPATION TYPE: Primary | ICD-10-CM

## 2023-03-07 PROCEDURE — 99282 EMERGENCY DEPT VISIT SF MDM: CPT

## 2023-03-07 RX ORDER — POLYETHYLENE GLYCOL 3350 17 G/17G
17 POWDER, FOR SOLUTION ORAL DAILY
Qty: 500 EACH | Refills: 0 | Status: SHIPPED | OUTPATIENT
Start: 2023-03-07 | End: 2023-03-13

## 2023-03-08 VITALS
RESPIRATION RATE: 17 BRPM | HEART RATE: 69 BPM | SYSTOLIC BLOOD PRESSURE: 188 MMHG | DIASTOLIC BLOOD PRESSURE: 82 MMHG | TEMPERATURE: 97 F | BODY MASS INDEX: 29.49 KG/M2 | OXYGEN SATURATION: 98 % | HEIGHT: 63 IN | WEIGHT: 166.44 LBS

## 2023-03-08 NOTE — ED PROVIDER NOTES
Encounter Date: 3/7/2023       History     Chief Complaint   Patient presents with    Abdominal Pain    Constipation     Pt c/c of constipation and abd pain for a week.. Pt had bladder surgery a week ago, and has not been able to have a BM since the surgery.  Pt has taken stool softeners and an enema  prior to arrival.  Pt reports N/V starting today.      Ricky Hassan is a 82 y.o. female presents with constipation this past week  Patient with bladder surgery with constipation afterwards; enema earlier tonight  Patient on exam has no signs of peritonitis rebound on evaluation this evening  Digital rectal exam shows a large impaction in the rectal vault, no bleeding now  Will need disimpaction, patient agrees to disimpaction & enema in the ER today    Review of patient's allergies indicates:   Allergen Reactions    Cipro [ciprofloxacin hcl] Itching    Vicodin [hydrocodone-acetaminophen] Hives    Adhesive Dermatitis     Long term adhesive sensitivity    Sulfa (sulfonamide antibiotics) Rash     Past Medical History:   Diagnosis Date    Arthritis     Digestive disorder     Hyperlipidemia     Hypertension      Past Surgical History:   Procedure Laterality Date    BLADDER FULGURATION N/A 3/2/2023    Procedure: FULGURATION, BLADDER;  Surgeon: Reed Walton MD;  Location: [facility] OR;  Service: Urology;  Laterality: N/A;    CATARACT EXTRACTION Bilateral     CYSTOSCOPY W/ RETROGRADES Bilateral 3/2/2023    Procedure: CYSTOSCOPY, WITH RETROGRADE PYELOGRAM;  Surgeon: Reed Walton MD;  Location: [facility] OR;  Service: Urology;  Laterality: Bilateral;  cysto with bilateral retrogrades with possible bladder fulguration    HYSTERECTOMY      KNEE ARTHROSCOPY Bilateral      No family history on file.  Social History     Tobacco Use    Smoking status: Never    Smokeless tobacco: Never   Substance Use Topics    Alcohol use: Never    Drug use: Never     Review of Systems   Constitutional: Negative.    HENT: Negative.     Eyes:  Negative.    Respiratory: Negative.     Cardiovascular: Negative.    Gastrointestinal:  Positive for constipation.   Genitourinary: Negative.    Musculoskeletal: Negative.    Skin: Negative.    Neurological: Negative.    Psychiatric/Behavioral: Negative.       Physical Exam     Initial Vitals   BP Pulse Resp Temp SpO2   23   (!) 195/81 72 18 96.5 °F (35.8 °C) 97 %      MAP       --                Physical Exam    Nursing note and vitals reviewed.  Constitutional: Vital signs are normal. She appears well-developed and well-nourished. She is cooperative.   HENT:   Head: Normocephalic and atraumatic.   Right Ear: External ear normal.   Left Ear: External ear normal.   Nose: Nose normal.   Mouth/Throat: Oropharynx is clear and moist.   Eyes: Conjunctivae, EOM and lids are normal. Pupils are equal, round, and reactive to light.   Neck: Trachea normal and phonation normal. Neck supple. No JVD present.   Normal range of motion.   Full passive range of motion without pain.     Cardiovascular:  Normal rate, regular rhythm, S1 normal, S2 normal, normal heart sounds, intact distal pulses and normal pulses.           Pulmonary/Chest: Effort normal and breath sounds normal.   Abdominal: Abdomen is soft and flat. Bowel sounds are normal.   Musculoskeletal:         General: Normal range of motion.      Cervical back: Full passive range of motion without pain, normal range of motion and neck supple.     Neurological: She is alert and oriented to person, place, and time. She has normal strength.   Skin: Skin is warm, dry and intact. Capillary refill takes less than 2 seconds.       ED Course   Procedures  Labs Reviewed - No data to display       Imaging Results    None          Medications - No data to display  Medical Decision Makin-year-old female with postoperative constipation after bladder surgery  Digital rectal exam with significant stool burden,  disimpacted in the ER  Patient tolerated disimpaction well with no subsequent issues after  Patient had a soapsuds enema as well was produce good stool output  Patient was counseled on high-fiber diet, MiraLax Rx on discharge tonight  Patient counseled to follow-up with primary care physician 48 hours on DC  Patient counseled to return to the ER with any concerning symptoms on DC                        Clinical Impression:   Final diagnoses:  [K59.00] Constipation, unspecified constipation type (Primary)        ED Disposition Condition    Discharge Stable          ED Prescriptions       Medication Sig Dispense Start Date End Date Auth. Provider    polyethylene glycol (GLYCOLAX) 17 gram/dose powder Take 17 g by mouth once daily. 500 each 3/7/2023 -- Boston Degroot MD          Follow-up Information       Follow up With Specialties Details Why Contact Info    Bear Bolaños MD Family Medicine Go in 2 days  [address] [id number]  [phone]               Boston Degroot MD  03/07/23 4350

## 2023-03-13 ENCOUNTER — OFFICE VISIT (OUTPATIENT)
Dept: CARDIOLOGY | Facility: CLINIC | Age: 83
End: 2023-03-13
Payer: MEDICARE

## 2023-03-13 VITALS
RESPIRATION RATE: 18 BRPM | OXYGEN SATURATION: 98 % | WEIGHT: 162.94 LBS | SYSTOLIC BLOOD PRESSURE: 146 MMHG | HEIGHT: 63 IN | DIASTOLIC BLOOD PRESSURE: 70 MMHG | HEART RATE: 67 BPM | BODY MASS INDEX: 28.87 KG/M2

## 2023-03-13 DIAGNOSIS — I10 PRIMARY HYPERTENSION: ICD-10-CM

## 2023-03-13 DIAGNOSIS — R94.31 ABNORMAL ELECTROCARDIOGRAM: ICD-10-CM

## 2023-03-13 DIAGNOSIS — I35.1 AORTIC EJECTION MURMUR: ICD-10-CM

## 2023-03-13 PROCEDURE — 1101F PR PT FALLS ASSESS DOC 0-1 FALLS W/OUT INJ PAST YR: ICD-10-PCS | Mod: CPTII,S$GLB,, | Performed by: INTERNAL MEDICINE

## 2023-03-13 PROCEDURE — 99999 PR PBB SHADOW E&M-EST. PATIENT-LVL III: CPT | Mod: PBBFAC,,, | Performed by: INTERNAL MEDICINE

## 2023-03-13 PROCEDURE — 1157F PR ADVANCE CARE PLAN OR EQUIV PRESENT IN MEDICAL RECORD: ICD-10-PCS | Mod: CPTII,S$GLB,, | Performed by: INTERNAL MEDICINE

## 2023-03-13 PROCEDURE — 1126F PR PAIN SEVERITY QUANTIFIED, NO PAIN PRESENT: ICD-10-PCS | Mod: CPTII,S$GLB,, | Performed by: INTERNAL MEDICINE

## 2023-03-13 PROCEDURE — 3078F DIAST BP <80 MM HG: CPT | Mod: CPTII,S$GLB,, | Performed by: INTERNAL MEDICINE

## 2023-03-13 PROCEDURE — 99999 PR PBB SHADOW E&M-EST. PATIENT-LVL III: ICD-10-PCS | Mod: PBBFAC,,, | Performed by: INTERNAL MEDICINE

## 2023-03-13 PROCEDURE — 99214 OFFICE O/P EST MOD 30 MIN: CPT | Mod: S$GLB,,, | Performed by: INTERNAL MEDICINE

## 2023-03-13 PROCEDURE — 1126F AMNT PAIN NOTED NONE PRSNT: CPT | Mod: CPTII,S$GLB,, | Performed by: INTERNAL MEDICINE

## 2023-03-13 PROCEDURE — 1101F PT FALLS ASSESS-DOCD LE1/YR: CPT | Mod: CPTII,S$GLB,, | Performed by: INTERNAL MEDICINE

## 2023-03-13 PROCEDURE — 3288F FALL RISK ASSESSMENT DOCD: CPT | Mod: CPTII,S$GLB,, | Performed by: INTERNAL MEDICINE

## 2023-03-13 PROCEDURE — 99214 PR OFFICE/OUTPT VISIT, EST, LEVL IV, 30-39 MIN: ICD-10-PCS | Mod: S$GLB,,, | Performed by: INTERNAL MEDICINE

## 2023-03-13 PROCEDURE — 1157F ADVNC CARE PLAN IN RCRD: CPT | Mod: CPTII,S$GLB,, | Performed by: INTERNAL MEDICINE

## 2023-03-13 PROCEDURE — 3288F PR FALLS RISK ASSESSMENT DOCUMENTED: ICD-10-PCS | Mod: CPTII,S$GLB,, | Performed by: INTERNAL MEDICINE

## 2023-03-13 PROCEDURE — 3078F PR MOST RECENT DIASTOLIC BLOOD PRESSURE < 80 MM HG: ICD-10-PCS | Mod: CPTII,S$GLB,, | Performed by: INTERNAL MEDICINE

## 2023-03-13 PROCEDURE — 3077F SYST BP >= 140 MM HG: CPT | Mod: CPTII,S$GLB,, | Performed by: INTERNAL MEDICINE

## 2023-03-13 PROCEDURE — 3077F PR MOST RECENT SYSTOLIC BLOOD PRESSURE >= 140 MM HG: ICD-10-PCS | Mod: CPTII,S$GLB,, | Performed by: INTERNAL MEDICINE

## 2023-03-13 RX ORDER — OXYBUTYNIN CHLORIDE 15 MG/1
5 TABLET, EXTENDED RELEASE ORAL DAILY
COMMUNITY
End: 2023-03-14 | Stop reason: ALTCHOICE

## 2023-03-13 RX ORDER — FLUTICASONE PROPIONATE 50 MCG
SPRAY, SUSPENSION (ML) NASAL
COMMUNITY
Start: 2023-03-12

## 2023-03-13 NOTE — PROGRESS NOTES
[facility] Cardiology     Subjective:    Patient ID:  Ricky Hassan is a 82 y.o. female who presents for follow-up of Hypertension, Hyperlipidemia, and RBBB/LAFB    Review of patient's allergies indicates:   Allergen Reactions    Cipro [ciprofloxacin hcl] Itching    Vicodin [hydrocodone-acetaminophen] Hives    Adhesive Dermatitis     Long term adhesive sensitivity    Sulfa (sulfonamide antibiotics) Rash      She had cystoscopy on March 3, 2023 for recurrent urinary tract infections.  It was outpatient in [place].  She did well.  She has hypertension.  She has bifascicular block.  I told her that Toprol with likely be withdrawn after the surgery.  She has no history of coronary artery disease.  Annamaria procedure she did well.  Currently, she is having problems with low blood pressure.  She takes losartan and Toprol in the evening.  She went to the emergency room after the surgery and her blood pressure was not 190 systolic.  Today's blood pressure is 145 systolic.  She has a fairly new blood pressure machine and she thinks it is accurate.  Prior to her surgery she was not having blood pressures in the 100 systolic range.  She brings in readings that even has a 95 mm Hg systolic blood pressure.  She was scheduled to see me next week but opted to come in early.      A recent echo February 2023 showed moderate LVH no significant aortic valve disease.  She does have a soft ejection murmur.      Review of Systems   Constitutional: Negative for chills, decreased appetite, diaphoresis, fever, malaise/fatigue, night sweats, weight gain and weight loss.   HENT:  Negative for congestion, ear discharge, ear pain, hearing loss, hoarse voice, nosebleeds, odynophagia, sore throat, stridor and tinnitus.    Eyes:  Negative for blurred vision, discharge, double vision, pain, photophobia, redness, vision loss in left eye, vision loss in right eye, visual  disturbance and visual halos.   Cardiovascular:  Negative for chest pain, claudication, cyanosis, dyspnea on exertion, irregular heartbeat, leg swelling, near-syncope, orthopnea, palpitations, paroxysmal nocturnal dyspnea and syncope.   Respiratory:  Negative for cough, hemoptysis, shortness of breath, sleep disturbances due to breathing, snoring, sputum production and wheezing.    Endocrine: Negative for cold intolerance, heat intolerance, polydipsia, polyphagia and polyuria.   Hematologic/Lymphatic: Negative for adenopathy and bleeding problem. Does not bruise/bleed easily.   Skin:  Negative for color change, dry skin, flushing, itching, nail changes, poor wound healing, rash, skin cancer, suspicious lesions and unusual hair distribution.   Musculoskeletal:  Negative for arthritis, back pain, falls, gout, joint pain, joint swelling, muscle cramps, muscle weakness, myalgias, neck pain and stiffness.   Gastrointestinal:  Negative for bloating, abdominal pain, anorexia, change in bowel habit, bowel incontinence, constipation, diarrhea, dysphagia, excessive appetite, flatus, heartburn, hematemesis, hematochezia, hemorrhoids, jaundice, melena, nausea and vomiting.   Genitourinary:  Negative for bladder incontinence, decreased libido, dysuria, flank pain, frequency, genital sores, hematuria, hesitancy, incomplete emptying, nocturia and urgency.   Neurological:  Negative for aphonia, brief paralysis, difficulty with concentration, disturbances in coordination, excessive daytime sleepiness, dizziness, focal weakness, headaches, light-headedness, loss of balance, numbness, paresthesias, seizures, sensory change, tremors, vertigo and weakness.   Psychiatric/Behavioral:  Negative for altered mental status, depression, hallucinations, memory loss, substance abuse, suicidal ideas and thoughts of violence. The patient does not have insomnia and is not nervous/anxious.    Allergic/Immunologic: Negative for hives and persistent  "infections.      Objective:       Vitals:    03/13/23 1400   BP: (!) 146/70   Pulse: 67   Resp: 18   SpO2: 98%   Weight: 73.9 kg (162 lb 14.7 oz)   Height: 5' 3" (1.6 m)    Physical Exam  Constitutional:       General: She is not in acute distress.     Appearance: She is well-developed. She is not diaphoretic.   HENT:      Head: Normocephalic and atraumatic.      Nose: Nose normal.   Eyes:      General: No scleral icterus.        Right eye: No discharge.      Conjunctiva/sclera: Conjunctivae normal.      Pupils: Pupils are equal, round, and reactive to light.   Neck:      Thyroid: No thyromegaly.      Vascular: No JVD.      Trachea: No tracheal deviation.   Cardiovascular:      Rate and Rhythm: Normal rate and regular rhythm.      Pulses:           Carotid pulses are 2+ on the right side and 2+ on the left side.       Radial pulses are 2+ on the right side and 2+ on the left side.        Dorsalis pedis pulses are 2+ on the right side and 2+ on the left side.        Posterior tibial pulses are 2+ on the right side and 2+ on the left side.      Heart sounds: Murmur heard.   Harsh early systolic murmur is present with a grade of 2/6 at the upper right sternal border.     No friction rub. No gallop.   Pulmonary:      Effort: Pulmonary effort is normal. No respiratory distress.      Breath sounds: Normal breath sounds. No stridor. No wheezing or rales.   Chest:      Chest wall: No tenderness.   Abdominal:      General: Bowel sounds are normal. There is no distension.      Palpations: Abdomen is soft. There is no mass.      Tenderness: There is no abdominal tenderness. There is no guarding or rebound.   Musculoskeletal:         General: No tenderness. Normal range of motion.      Cervical back: Normal range of motion and neck supple.   Lymphadenopathy:      Cervical: No cervical adenopathy.   Skin:     General: Skin is warm and dry.      Coloration: Skin is not pale.      Findings: No erythema or rash.   Neurological:     "  Mental Status: She is alert and oriented to person, place, and time.      Cranial Nerves: No cranial nerve deficit.      Coordination: Coordination normal.   Psychiatric:         Behavior: Behavior normal.         Thought Content: Thought content normal.         Judgment: Judgment normal.         Assessment:       1. Primary hypertension    2. Aortic ejection murmur    3. Abnormal electrocardiogram      Results for orders placed or performed during the hospital encounter of 02/22/23   Echo   Result Value Ref Range    BSA 1.84 m2    TDI SEPTAL 0.05 m/s    LV LATERAL E/E' RATIO 14.80 m/s    LV SEPTAL E/E' RATIO 14.80 m/s    LA WIDTH 2.60 cm    IVC diameter 14 cm    Left Ventricular Outflow Tract Mean Velocity 0.97 cm/s    Left Ventricular Outflow Tract Mean Gradient 4.05 mmHg    Pulmonary Valve Mean Velocity 0.89 m/s    TDI LATERAL 0.05 m/s    PV PEAK VELOCITY 1.15 cm/s    LVIDd 4.52 3.5 - 6.0 cm    IVS 1.04 0.6 - 1.1 cm    Posterior Wall 1.07 0.6 - 1.1 cm    LVIDs 2.91 2.1 - 4.0 cm    FS 36 28 - 44 %    LA volume 34.60 cm3    Sinus 2.73 cm    STJ 2.69 cm    Ascending aorta 2.61 cm    LV mass 166.24 g    LA size 3.15 cm    RVDD 2.82 cm    Left Ventricle Relative Wall Thickness 0.47 cm    AV mean gradient 5 mmHg    AV valve area 1.98 cm2    AV Velocity Ratio 0.88     AV index (prosthetic) 0.91     MV valve area p 1/2 method 2.54 cm2    E/A ratio 0.69     Mean e' 0.05 m/s    E wave deceleration time 298.93 msec    IVRT 133.21 msec    Pulm vein S/D ratio 1.65     LVOT diameter 1.67 cm    LVOT area 2.2 cm2    LVOT peak vishnu 1.28 m/s    LVOT peak VTI 32.00 cm    Ao peak vishnu 1.45 m/s    Ao VTI 35.3 cm    RVOT peak vishnu 1.05 m/s    RVOT peak VTI 23.6 cm    LVOT stroke volume 70.06 cm3    AV peak gradient 8 mmHg    PV mean gradient 2.75 mmHg    E/E' ratio 14.80 m/s    MV Peak E Vishnu 0.74 m/s    TR Max Vishnu 1.90 m/s    MV stenosis pressure 1/2 time 86.69 ms    MV Peak A Vishnu 1.07 m/s    PV Peak S Vishnu 0.43 m/s    PV Peak D Vishnu 0.26  m/s    LV Systolic Volume 32.57 mL    LV Systolic Volume Index 18.2 mL/m2    LV Diastolic Volume 93.24 mL    LV Diastolic Volume Index 52.09 mL/m2    LA Volume Index 19.3 mL/m2    LV Mass Index 93 g/m2    RA Major Axis 4.52 cm    Left Atrium Minor Axis 5.02 cm    Left Atrium Major Axis 4.92 cm    Triscuspid Valve Regurgitation Peak Gradient 14 mmHg    LA Volume Index (Mod) 21.2 mL/m2    LA volume (mod) 37.98 cm3    RA Width 3.35 cm    Right Atrial Pressure (from IVC) 8 mmHg    EF 60 %    TV rest pulmonary artery pressure 22 mmHg         Current Outpatient Medications:     celecoxib (CELEBREX) 100 MG capsule, Take 100 mg by mouth once daily., Disp: , Rfl:     fluticasone propionate (FLONASE) 50 mcg/actuation nasal spray, by Each Nostril route., Disp: , Rfl:     losartan (COZAAR) 50 MG tablet, Take 50 mg by mouth once daily., Disp: , Rfl:     pantoprazole (PROTONIX) 40 MG tablet, Take 40 mg by mouth once daily., Disp: , Rfl:     tamsulosin (FLOMAX) 0.4 mg Cap, Take 1 capsule (0.4 mg total) by mouth once daily., Disp: 30 capsule, Rfl: 2    methenamine (HIPREX) 1 gram Tab, Take 1 tablet (1 g total) by mouth 2 (two) times daily., Disp: 180 tablet, Rfl: 3     Lab Results   Component Value Date    WBC 7.85 02/13/2023    RBC 4.64 02/13/2023    HGB 14.2 02/13/2023    HCT 42.1 02/13/2023    MCV 91 02/13/2023    MCH 30.6 02/13/2023    MCHC 33.7 02/13/2023    RDW 13.6 02/13/2023     02/13/2023    MPV 11.1 02/13/2023    GRAN 5.0 02/13/2023    GRAN 63.6 02/13/2023    LYMPH 1.9 02/13/2023    LYMPH 24.1 02/13/2023    MONO 0.6 02/13/2023    MONO 8.0 02/13/2023    EOS 0.3 02/13/2023    BASO 0.08 02/13/2023    EOSINOPHIL 3.2 02/13/2023    BASOPHIL 1.0 02/13/2023        CMP  Lab Results   Component Value Date     02/13/2023    K 4.3 02/13/2023     02/13/2023    CO2 29 02/13/2023    GLU 97 02/13/2023    BUN 19 (H) 02/13/2023    CREATININE 0.73 02/13/2023    CALCIUM 9.2 02/13/2023    ANIONGAP 7 (L) 02/13/2023         Lab Results   Component Value Date    LABURIN No significant growth 03/27/2023            Results for orders placed or performed in visit on 02/22/23   EKG 12-lead    Collection Time: 02/22/23 10:18 AM    Narrative    Test Reason : R01.1,I45.10,    Vent. Rate : 056 BPM     Atrial Rate : 056 BPM     P-R Int : 194 ms          QRS Dur : 134 ms      QT Int : 428 ms       P-R-T Axes : 038 -57 019 degrees     QTc Int : 413 ms    Sinus bradycardia  Right bundle branch block  Left anterior fascicular block   Bifascicular block   Abnormal ECG  No previous ECGs available  Confirmed by Remberto Jesus MD ([id number]) on 2/22/2023 1:36:22 PM    Referred By: [email or web address]   SELF           Confirmed By:Remberto Jesus MD                  Plan:       Problem List Items Addressed This Visit          Cardiac/Vascular    Primary hypertension     She will continue losartan at 50 mg daily.  Toprol withdrawn today.         Abnormal electrocardiogram     Bifascicular block-chronic.  Condition tolerated well.         Aortic ejection murmur     No aortic stenosis by echo.  Condition stable.               The patient will discontinue Toprol.  She will monitor her readings and continue losartan 50 mg q.h.s..  She may end up on 100 mg daily.      I made a four-month follow-up visit.               Remberto Jesus MD  03/30/2023   2:30 PM

## 2023-03-14 ENCOUNTER — OFFICE VISIT (OUTPATIENT)
Dept: UROLOGY | Facility: CLINIC | Age: 83
End: 2023-03-14
Payer: MEDICARE

## 2023-03-14 VITALS
HEIGHT: 63 IN | SYSTOLIC BLOOD PRESSURE: 123 MMHG | WEIGHT: 164.19 LBS | HEART RATE: 77 BPM | BODY MASS INDEX: 29.09 KG/M2 | DIASTOLIC BLOOD PRESSURE: 72 MMHG

## 2023-03-14 DIAGNOSIS — R35.1 NOCTURIA: ICD-10-CM

## 2023-03-14 DIAGNOSIS — N30.80 CYSTITIS CYSTICA: ICD-10-CM

## 2023-03-14 DIAGNOSIS — Z98.890 POST-OPERATIVE STATE: Primary | ICD-10-CM

## 2023-03-14 DIAGNOSIS — Z87.440 HISTORY OF RECURRENT UTIS: ICD-10-CM

## 2023-03-14 LAB
BILIRUB UR QL STRIP: NEGATIVE
CLARITY UR REFRACT.AUTO: CLEAR
COLOR UR AUTO: YELLOW
GLUCOSE UR QL STRIP: NEGATIVE
HGB UR QL STRIP: NEGATIVE
KETONES UR QL STRIP: NEGATIVE
LEUKOCYTE ESTERASE UR QL STRIP: NEGATIVE
NITRITE UR QL STRIP: NEGATIVE
PH UR STRIP: 6 [PH] (ref 5–8)
PROT UR QL STRIP: NEGATIVE
SP GR UR STRIP: 1.01 (ref 1–1.03)
URN SPEC COLLECT METH UR: NORMAL

## 2023-03-14 PROCEDURE — 1160F PR REVIEW ALL MEDS BY PRESCRIBER/CLIN PHARMACIST DOCUMENTED: ICD-10-PCS | Mod: CPTII,S$GLB,, | Performed by: NURSE PRACTITIONER

## 2023-03-14 PROCEDURE — 3078F DIAST BP <80 MM HG: CPT | Mod: CPTII,S$GLB,, | Performed by: NURSE PRACTITIONER

## 2023-03-14 PROCEDURE — 87086 URINE CULTURE/COLONY COUNT: CPT | Performed by: NURSE PRACTITIONER

## 2023-03-14 PROCEDURE — 3074F SYST BP LT 130 MM HG: CPT | Mod: CPTII,S$GLB,, | Performed by: NURSE PRACTITIONER

## 2023-03-14 PROCEDURE — 99999 PR PBB SHADOW E&M-EST. PATIENT-LVL III: CPT | Mod: PBBFAC,,, | Performed by: NURSE PRACTITIONER

## 2023-03-14 PROCEDURE — 87088 URINE BACTERIA CULTURE: CPT | Performed by: NURSE PRACTITIONER

## 2023-03-14 PROCEDURE — 3074F PR MOST RECENT SYSTOLIC BLOOD PRESSURE < 130 MM HG: ICD-10-PCS | Mod: CPTII,S$GLB,, | Performed by: NURSE PRACTITIONER

## 2023-03-14 PROCEDURE — 3288F FALL RISK ASSESSMENT DOCD: CPT | Mod: CPTII,S$GLB,, | Performed by: NURSE PRACTITIONER

## 2023-03-14 PROCEDURE — 1160F RVW MEDS BY RX/DR IN RCRD: CPT | Mod: CPTII,S$GLB,, | Performed by: NURSE PRACTITIONER

## 2023-03-14 PROCEDURE — 99999 PR PBB SHADOW E&M-EST. PATIENT-LVL III: ICD-10-PCS | Mod: PBBFAC,,, | Performed by: NURSE PRACTITIONER

## 2023-03-14 PROCEDURE — 1157F ADVNC CARE PLAN IN RCRD: CPT | Mod: CPTII,S$GLB,, | Performed by: NURSE PRACTITIONER

## 2023-03-14 PROCEDURE — 87186 SC STD MICRODIL/AGAR DIL: CPT | Performed by: NURSE PRACTITIONER

## 2023-03-14 PROCEDURE — 81003 URINALYSIS AUTO W/O SCOPE: CPT | Performed by: NURSE PRACTITIONER

## 2023-03-14 PROCEDURE — 1159F MED LIST DOCD IN RCRD: CPT | Mod: CPTII,S$GLB,, | Performed by: NURSE PRACTITIONER

## 2023-03-14 PROCEDURE — 87077 CULTURE AEROBIC IDENTIFY: CPT | Performed by: NURSE PRACTITIONER

## 2023-03-14 PROCEDURE — 1126F PR PAIN SEVERITY QUANTIFIED, NO PAIN PRESENT: ICD-10-PCS | Mod: CPTII,S$GLB,, | Performed by: NURSE PRACTITIONER

## 2023-03-14 PROCEDURE — 99024 POSTOP FOLLOW-UP VISIT: CPT | Mod: S$GLB,,, | Performed by: NURSE PRACTITIONER

## 2023-03-14 PROCEDURE — 1157F PR ADVANCE CARE PLAN OR EQUIV PRESENT IN MEDICAL RECORD: ICD-10-PCS | Mod: CPTII,S$GLB,, | Performed by: NURSE PRACTITIONER

## 2023-03-14 PROCEDURE — 3078F PR MOST RECENT DIASTOLIC BLOOD PRESSURE < 80 MM HG: ICD-10-PCS | Mod: CPTII,S$GLB,, | Performed by: NURSE PRACTITIONER

## 2023-03-14 PROCEDURE — 3288F PR FALLS RISK ASSESSMENT DOCUMENTED: ICD-10-PCS | Mod: CPTII,S$GLB,, | Performed by: NURSE PRACTITIONER

## 2023-03-14 PROCEDURE — 1101F PR PT FALLS ASSESS DOC 0-1 FALLS W/OUT INJ PAST YR: ICD-10-PCS | Mod: CPTII,S$GLB,, | Performed by: NURSE PRACTITIONER

## 2023-03-14 PROCEDURE — 99024 PR POST-OP FOLLOW-UP VISIT: ICD-10-PCS | Mod: S$GLB,,, | Performed by: NURSE PRACTITIONER

## 2023-03-14 PROCEDURE — 1101F PT FALLS ASSESS-DOCD LE1/YR: CPT | Mod: CPTII,S$GLB,, | Performed by: NURSE PRACTITIONER

## 2023-03-14 PROCEDURE — 1126F AMNT PAIN NOTED NONE PRSNT: CPT | Mod: CPTII,S$GLB,, | Performed by: NURSE PRACTITIONER

## 2023-03-14 PROCEDURE — 1159F PR MEDICATION LIST DOCUMENTED IN MEDICAL RECORD: ICD-10-PCS | Mod: CPTII,S$GLB,, | Performed by: NURSE PRACTITIONER

## 2023-03-14 RX ORDER — SOLIFENACIN SUCCINATE 5 MG/1
5 TABLET, FILM COATED ORAL NIGHTLY
Qty: 30 TABLET | Refills: 11 | Status: SHIPPED | OUTPATIENT
Start: 2023-03-14 | End: 2023-03-15 | Stop reason: SINTOL

## 2023-03-14 NOTE — PATIENT INSTRUCTIONS
Start taking methenamine 1 gram (bladder antiseptic) twice daily for UTI prevention after completion of antibiotic therapy.  Ok to discontinue tamsulosin (Flomax) at this time.   Discontinue oxybutynin (Ditropan XL) at this time.   Start trial of solifenacin (Vesicare) 5 mg at bedtime for management of nocturia.   Follow-up in 4 weeks via Virtual Video.   
normal...

## 2023-03-14 NOTE — PROGRESS NOTES
Subjective:       Patient ID: Ricky Hassan is a 82 y.o. female.    Chief Complaint: Post-op Evaluation    Patient is here today for her post-op evaluation. She is S/P cystourethroscopy with bilateral retrograde pyelograms and cystourethroscopy with bladder fulguration for cystitis cystica by Dr. Walton on 3/2/2023.     Other  This is a new (S/P bladder fulguration) problem. Episode onset: 3/2/2023. The problem has been gradually improving. Associated symptoms include a change in bowel habit and urinary symptoms. Pertinent negatives include no abdominal pain, anorexia, chills, fatigue, fever, headaches, nausea, swollen glands, vomiting or weakness. Nothing aggravates the symptoms. Treatments tried: bladder fulguration. The treatment provided significant relief.   Review of Systems   Constitutional:  Negative for chills, fatigue and fever.   Gastrointestinal:  Positive for change in bowel habit, constipation and change in bowel habit. Negative for abdominal pain, anorexia, diarrhea, nausea and vomiting.   Genitourinary:  Positive for nocturia (x2-3). Negative for decreased urine volume, difficulty urinating, dysuria, flank pain, frequency, hematuria, pelvic pain, urgency, vaginal bleeding, vaginal discharge and vaginal pain.   Neurological:  Negative for dizziness, weakness and headaches.   Psychiatric/Behavioral:  Positive for sleep disturbance.        Objective:      Physical Exam  Vitals and nursing note reviewed.   Constitutional:       Appearance: She is well-developed.   HENT:      Head: Normocephalic and atraumatic.   Eyes:      Pupils: Pupils are equal, round, and reactive to light.   Cardiovascular:      Rate and Rhythm: Normal rate.   Pulmonary:      Effort: Pulmonary effort is normal. No respiratory distress.   Abdominal:      Palpations: Abdomen is soft.      Tenderness: There is no abdominal tenderness.   Musculoskeletal:         General: Normal range of motion.      Cervical back: Normal range of  motion.   Skin:     General: Skin is warm and dry.   Neurological:      Mental Status: She is alert and oriented to person, place, and time.      Coordination: Coordination normal.   Psychiatric:         Mood and Affect: Mood normal.         Behavior: Behavior normal.         Thought Content: Thought content normal.         Judgment: Judgment normal.       Assessment:       Problem List Items Addressed This Visit          Renal/    History of recurrent UTIs    Cystitis cystica     Other Visit Diagnoses       Post-operative state    -  Primary    Nocturia        Relevant Medications    solifenacin (VESICARE) 5 MG tablet              Plan:           Ricky was seen today for post-op evaluation.    Diagnoses and all orders for this visit:    Post-operative state  -     Urine culture  -     Urinalysis    Cystitis cystica  -     Urine culture  -     Urinalysis    History of recurrent UTIs  -     Urine culture  -     Urinalysis    Nocturia  -     solifenacin (VESICARE) 5 MG tablet; Take 1 tablet (5 mg total) by mouth nightly.  -     Urine culture  -     Urinalysis    Other orders  Start taking methenamine 1 gram (bladder antiseptic) twice daily for UTI prevention after completion of antibiotic therapy.  Ok to discontinue tamsulosin (Flomax) at this time.   Discontinue oxybutynin (Ditropan XL) at this time.   Start trial of solifenacin (Vesicare) 5 mg at bedtime for management of nocturia.     Follow-up in 4 weeks via Virtual Video.     Lisa Dorantes NP

## 2023-03-15 ENCOUNTER — PATIENT MESSAGE (OUTPATIENT)
Dept: UROLOGY | Facility: CLINIC | Age: 83
End: 2023-03-15
Payer: MEDICARE

## 2023-03-16 LAB — BACTERIA UR CULT: ABNORMAL

## 2023-03-17 ENCOUNTER — TELEPHONE (OUTPATIENT)
Dept: UROLOGY | Facility: CLINIC | Age: 83
End: 2023-03-17
Payer: MEDICARE

## 2023-03-17 ENCOUNTER — PATIENT MESSAGE (OUTPATIENT)
Dept: UROLOGY | Facility: CLINIC | Age: 83
End: 2023-03-17
Payer: MEDICARE

## 2023-03-17 DIAGNOSIS — N30.00 ACUTE CYSTITIS WITHOUT HEMATURIA: Primary | ICD-10-CM

## 2023-03-17 RX ORDER — CEFIXIME 400 MG/1
400 CAPSULE ORAL DAILY
Qty: 10 CAPSULE | Refills: 0 | Status: SHIPPED | OUTPATIENT
Start: 2023-03-17 | End: 2023-03-27

## 2023-03-17 NOTE — PROGRESS NOTES
Diagnoses and all orders for this visit:    Acute cystitis without hematuria  -     cefixime (SUPRAX) 400 mg Cap; Take 400 mg by mouth Daily. for 10 days  -     Urine culture; Future    Repeat urine cx after completion of antibiotic therapy (in 2 weeks).     Lisa Dorantes, NP

## 2023-03-17 NOTE — TELEPHONE ENCOUNTER
----- Message from Lisa Dorantes NP sent at 3/17/2023  2:26 PM CDT -----  Please inform patient's daughter via telephone that patient's urine cx was positive for a UTI. Script for Suprax sent to [facility]. Repeat urine cx after completion of antibiotic therapy (in 2 weeks). Order placed.

## 2023-03-23 ENCOUNTER — PATIENT MESSAGE (OUTPATIENT)
Dept: UROLOGY | Facility: CLINIC | Age: 83
End: 2023-03-23
Payer: MEDICARE

## 2023-03-27 ENCOUNTER — LAB VISIT (OUTPATIENT)
Dept: LAB | Facility: HOSPITAL | Age: 83
End: 2023-03-27
Attending: NURSE PRACTITIONER
Payer: MEDICARE

## 2023-03-27 DIAGNOSIS — N30.00 ACUTE CYSTITIS WITHOUT HEMATURIA: ICD-10-CM

## 2023-03-27 PROCEDURE — 87086 URINE CULTURE/COLONY COUNT: CPT | Performed by: NURSE PRACTITIONER

## 2023-03-28 LAB — BACTERIA UR CULT: NORMAL

## 2023-03-29 ENCOUNTER — TELEPHONE (OUTPATIENT)
Dept: UROLOGY | Facility: CLINIC | Age: 83
End: 2023-03-29
Payer: MEDICARE

## 2023-03-29 NOTE — TELEPHONE ENCOUNTER
----- Message from Lisa Dorantes NP sent at 3/29/2023 12:19 PM CDT -----  Please inform patient via telephone that her urine cx was normal. Her UTI has resolved.

## 2023-04-04 ENCOUNTER — PATIENT MESSAGE (OUTPATIENT)
Dept: CARDIOLOGY | Facility: CLINIC | Age: 83
End: 2023-04-04
Payer: MEDICARE

## 2023-04-04 DIAGNOSIS — I10 PRIMARY HYPERTENSION: ICD-10-CM

## 2023-04-04 DIAGNOSIS — I10 PRIMARY HYPERTENSION: Primary | ICD-10-CM

## 2023-04-04 RX ORDER — LOSARTAN POTASSIUM 100 MG/1
100 TABLET ORAL DAILY
Qty: 90 TABLET | Refills: 3 | Status: SHIPPED | OUTPATIENT
Start: 2023-04-04 | End: 2023-04-04 | Stop reason: SDUPTHER

## 2023-04-04 RX ORDER — LOSARTAN POTASSIUM 100 MG/1
100 TABLET ORAL DAILY
Qty: 90 TABLET | Refills: 3 | Status: SHIPPED | OUTPATIENT
Start: 2023-04-04 | End: 2024-01-03

## 2023-06-05 PROBLEM — N39.0 RECURRENT UTI (URINARY TRACT INFECTION): Status: RESOLVED | Noted: 2023-03-02 | Resolved: 2023-06-05

## 2023-07-20 ENCOUNTER — OFFICE VISIT (OUTPATIENT)
Dept: CARDIOLOGY | Facility: CLINIC | Age: 83
End: 2023-07-20
Payer: MEDICARE

## 2023-07-20 VITALS
DIASTOLIC BLOOD PRESSURE: 80 MMHG | HEIGHT: 63 IN | WEIGHT: 167.56 LBS | OXYGEN SATURATION: 97 % | RESPIRATION RATE: 18 BRPM | SYSTOLIC BLOOD PRESSURE: 140 MMHG | BODY MASS INDEX: 29.69 KG/M2 | HEART RATE: 67 BPM

## 2023-07-20 DIAGNOSIS — I10 PRIMARY HYPERTENSION: ICD-10-CM

## 2023-07-20 DIAGNOSIS — I35.1 AORTIC EJECTION MURMUR: ICD-10-CM

## 2023-07-20 DIAGNOSIS — R94.31 ABNORMAL ELECTROCARDIOGRAM: ICD-10-CM

## 2023-07-20 PROCEDURE — 99999 PR PBB SHADOW E&M-EST. PATIENT-LVL III: CPT | Mod: PBBFAC,,, | Performed by: INTERNAL MEDICINE

## 2023-07-20 PROCEDURE — 3077F PR MOST RECENT SYSTOLIC BLOOD PRESSURE >= 140 MM HG: ICD-10-PCS | Mod: CPTII,S$GLB,, | Performed by: INTERNAL MEDICINE

## 2023-07-20 PROCEDURE — 99213 OFFICE O/P EST LOW 20 MIN: CPT | Mod: S$GLB,,, | Performed by: INTERNAL MEDICINE

## 2023-07-20 PROCEDURE — 1157F PR ADVANCE CARE PLAN OR EQUIV PRESENT IN MEDICAL RECORD: ICD-10-PCS | Mod: CPTII,S$GLB,, | Performed by: INTERNAL MEDICINE

## 2023-07-20 PROCEDURE — 3077F SYST BP >= 140 MM HG: CPT | Mod: CPTII,S$GLB,, | Performed by: INTERNAL MEDICINE

## 2023-07-20 PROCEDURE — 1126F AMNT PAIN NOTED NONE PRSNT: CPT | Mod: CPTII,S$GLB,, | Performed by: INTERNAL MEDICINE

## 2023-07-20 PROCEDURE — 3079F PR MOST RECENT DIASTOLIC BLOOD PRESSURE 80-89 MM HG: ICD-10-PCS | Mod: CPTII,S$GLB,, | Performed by: INTERNAL MEDICINE

## 2023-07-20 PROCEDURE — 3288F PR FALLS RISK ASSESSMENT DOCUMENTED: ICD-10-PCS | Mod: CPTII,S$GLB,, | Performed by: INTERNAL MEDICINE

## 2023-07-20 PROCEDURE — 1101F PR PT FALLS ASSESS DOC 0-1 FALLS W/OUT INJ PAST YR: ICD-10-PCS | Mod: CPTII,S$GLB,, | Performed by: INTERNAL MEDICINE

## 2023-07-20 PROCEDURE — 1157F ADVNC CARE PLAN IN RCRD: CPT | Mod: CPTII,S$GLB,, | Performed by: INTERNAL MEDICINE

## 2023-07-20 PROCEDURE — 99999 PR PBB SHADOW E&M-EST. PATIENT-LVL III: ICD-10-PCS | Mod: PBBFAC,,, | Performed by: INTERNAL MEDICINE

## 2023-07-20 PROCEDURE — 1160F RVW MEDS BY RX/DR IN RCRD: CPT | Mod: CPTII,S$GLB,, | Performed by: INTERNAL MEDICINE

## 2023-07-20 PROCEDURE — 3079F DIAST BP 80-89 MM HG: CPT | Mod: CPTII,S$GLB,, | Performed by: INTERNAL MEDICINE

## 2023-07-20 PROCEDURE — 1159F PR MEDICATION LIST DOCUMENTED IN MEDICAL RECORD: ICD-10-PCS | Mod: CPTII,S$GLB,, | Performed by: INTERNAL MEDICINE

## 2023-07-20 PROCEDURE — 99213 PR OFFICE/OUTPT VISIT, EST, LEVL III, 20-29 MIN: ICD-10-PCS | Mod: S$GLB,,, | Performed by: INTERNAL MEDICINE

## 2023-07-20 PROCEDURE — 1126F PR PAIN SEVERITY QUANTIFIED, NO PAIN PRESENT: ICD-10-PCS | Mod: CPTII,S$GLB,, | Performed by: INTERNAL MEDICINE

## 2023-07-20 PROCEDURE — 1101F PT FALLS ASSESS-DOCD LE1/YR: CPT | Mod: CPTII,S$GLB,, | Performed by: INTERNAL MEDICINE

## 2023-07-20 PROCEDURE — 1160F PR REVIEW ALL MEDS BY PRESCRIBER/CLIN PHARMACIST DOCUMENTED: ICD-10-PCS | Mod: CPTII,S$GLB,, | Performed by: INTERNAL MEDICINE

## 2023-07-20 PROCEDURE — 3288F FALL RISK ASSESSMENT DOCD: CPT | Mod: CPTII,S$GLB,, | Performed by: INTERNAL MEDICINE

## 2023-07-20 PROCEDURE — 1159F MED LIST DOCD IN RCRD: CPT | Mod: CPTII,S$GLB,, | Performed by: INTERNAL MEDICINE

## 2023-07-20 RX ORDER — VALACYCLOVIR HYDROCHLORIDE 1 G/1
1000 TABLET, FILM COATED ORAL EVERY 8 HOURS
COMMUNITY
Start: 2023-05-18 | End: 2023-07-20 | Stop reason: ALTCHOICE

## 2023-07-20 RX ORDER — GABAPENTIN 300 MG/1
300 CAPSULE ORAL
COMMUNITY
Start: 2023-05-16

## 2023-07-20 NOTE — ASSESSMENT & PLAN NOTE
She has right bundle branch block, left anterior fascicular block.  She is never had syncope or near-syncope.  Condition stable.

## 2023-07-20 NOTE — ASSESSMENT & PLAN NOTE
Her exam findings have not changed.  Condition stable.  No significant aortic valve disease on echo, study date  March 2023.

## 2023-07-20 NOTE — PROGRESS NOTES
[facility] Cardiology     Subjective:    Patient ID:  Ricky Hassan is a 82 y.o. female who presents for follow-up of Hypertension and Bifascicular Block    Review of patient's allergies indicates:   Allergen Reactions    Cipro [ciprofloxacin hcl] Itching    Vicodin [hydrocodone-acetaminophen] Hives    Adhesive Dermatitis     Long term adhesive sensitivity    Sulfa (sulfonamide antibiotics) Rash      She has hypertension and I converted from beta-blocker therapy to losartan.  After initiating 50 mg daily her dose was increased to 100 mg daily due to elevated readings.  Her echo in March of 2023 showed moderate LVH with preserved LV function.  There is ejection murmur previously with no significant aortic valve disease on echo.    She does not do a lot of activity outside the house.  She previously was checking her blood pressures in April and after a couple of normal readings in the 115 range systolic she stopped checking.  She has been feeling fine.  She is not had any awareness of dizziness or activity associated complaints.  Losartan has not caused any side effects.      Review of Systems   Constitutional: Negative for chills, decreased appetite, diaphoresis, fever, malaise/fatigue, night sweats, weight gain and weight loss.   HENT:  Negative for congestion, ear discharge, ear pain, hearing loss, hoarse voice, nosebleeds, odynophagia, sore throat, stridor and tinnitus.    Eyes:  Negative for blurred vision, discharge, double vision, pain, photophobia, redness, vision loss in left eye, vision loss in right eye, visual disturbance and visual halos.   Cardiovascular:  Negative for chest pain, claudication, cyanosis, dyspnea on exertion, irregular heartbeat, leg swelling, near-syncope, orthopnea, palpitations, paroxysmal nocturnal dyspnea and syncope.   Respiratory:  Negative for cough, hemoptysis, shortness of breath, sleep disturbances due  "to breathing, snoring, sputum production and wheezing.    Endocrine: Negative for cold intolerance, heat intolerance, polydipsia, polyphagia and polyuria.   Hematologic/Lymphatic: Negative for adenopathy and bleeding problem. Does not bruise/bleed easily.   Skin:  Negative for color change, dry skin, flushing, itching, nail changes, poor wound healing, rash, skin cancer, suspicious lesions and unusual hair distribution.   Musculoskeletal:  Negative for arthritis, back pain, falls, gout, joint pain, joint swelling, muscle cramps, muscle weakness, myalgias, neck pain and stiffness.   Gastrointestinal:  Negative for bloating, abdominal pain, anorexia, change in bowel habit, bowel incontinence, constipation, diarrhea, dysphagia, excessive appetite, flatus, heartburn, hematemesis, hematochezia, hemorrhoids, jaundice, melena, nausea and vomiting.   Genitourinary:  Negative for bladder incontinence, decreased libido, dysuria, flank pain, frequency, genital sores, hematuria, hesitancy, incomplete emptying, nocturia and urgency.   Neurological:  Negative for aphonia, brief paralysis, difficulty with concentration, disturbances in coordination, excessive daytime sleepiness, dizziness, focal weakness, headaches, light-headedness, loss of balance, numbness, paresthesias, seizures, sensory change, tremors, vertigo and weakness.   Psychiatric/Behavioral:  Negative for altered mental status, depression, hallucinations, memory loss, substance abuse, suicidal ideas and thoughts of violence. The patient does not have insomnia and is not nervous/anxious.    Allergic/Immunologic: Negative for hives and persistent infections.      Objective:       Vitals:    07/20/23 1002   BP: (!) 140/80   Pulse: 67   Resp: 18   SpO2: 97%   Weight: 76 kg (167 lb 8.8 oz)   Height: 5' 3" (1.6 m)    Physical Exam  Constitutional:       General: She is not in acute distress.     Appearance: She is well-developed. She is not diaphoretic.   HENT:      Head: " Normocephalic and atraumatic.      Nose: Nose normal.   Eyes:      General: No scleral icterus.        Right eye: No discharge.      Conjunctiva/sclera: Conjunctivae normal.      Pupils: Pupils are equal, round, and reactive to light.   Neck:      Thyroid: No thyromegaly.      Vascular: No JVD.      Trachea: No tracheal deviation.   Cardiovascular:      Rate and Rhythm: Normal rate and regular rhythm.      Pulses:           Carotid pulses are 2+ on the right side and 2+ on the left side.       Radial pulses are 2+ on the right side and 2+ on the left side.        Dorsalis pedis pulses are 2+ on the right side and 2+ on the left side.        Posterior tibial pulses are 2+ on the right side and 2+ on the left side.      Heart sounds: Murmur heard.   Harsh early systolic murmur is present with a grade of 2/6 at the upper right sternal border.     No friction rub. No gallop.   Pulmonary:      Effort: Pulmonary effort is normal. No respiratory distress.      Breath sounds: Normal breath sounds. No stridor. No wheezing or rales.   Chest:      Chest wall: No tenderness.   Abdominal:      General: Bowel sounds are normal. There is no distension.      Palpations: Abdomen is soft. There is no mass.      Tenderness: There is no abdominal tenderness. There is no guarding or rebound.   Musculoskeletal:         General: No tenderness. Normal range of motion.      Cervical back: Normal range of motion and neck supple.   Lymphadenopathy:      Cervical: No cervical adenopathy.   Skin:     General: Skin is warm and dry.      Coloration: Skin is not pale.      Findings: No erythema or rash.   Neurological:      Mental Status: She is alert and oriented to person, place, and time.      Cranial Nerves: No cranial nerve deficit.      Coordination: Coordination normal.   Psychiatric:         Behavior: Behavior normal.         Thought Content: Thought content normal.         Judgment: Judgment normal.         Assessment:       1. Aortic  ejection murmur    2. Abnormal electrocardiogram    3. Primary hypertension      Results for orders placed or performed in visit on 03/27/23   Urine culture    Specimen: Urine, Clean Catch   Result Value Ref Range    Urine Culture, Routine No significant growth          Current Outpatient Medications:     celecoxib (CELEBREX) 100 MG capsule, Take 100 mg by mouth once daily., Disp: , Rfl:     fluticasone propionate (FLONASE) 50 mcg/actuation nasal spray, by Each Nostril route., Disp: , Rfl:     losartan (COZAAR) 100 MG tablet, Take 1 tablet (100 mg total) by mouth once daily., Disp: 90 tablet, Rfl: 3    methenamine (HIPREX) 1 gram Tab, Take 1 tablet (1 g total) by mouth 2 (two) times daily., Disp: 180 tablet, Rfl: 3    pantoprazole (PROTONIX) 40 MG tablet, Take 40 mg by mouth once daily., Disp: , Rfl:     gabapentin (NEURONTIN) 300 MG capsule, Take 300 mg by mouth., Disp: , Rfl:      Lab Results   Component Value Date    WBC 7.85 02/13/2023    RBC 4.64 02/13/2023    HGB 14.2 02/13/2023    HCT 42.1 02/13/2023    MCV 91 02/13/2023    MCH 30.6 02/13/2023    MCHC 33.7 02/13/2023    RDW 13.6 02/13/2023     02/13/2023    MPV 11.1 02/13/2023    GRAN 5.0 02/13/2023    GRAN 63.6 02/13/2023    LYMPH 1.9 02/13/2023    LYMPH 24.1 02/13/2023    MONO 0.6 02/13/2023    MONO 8.0 02/13/2023    EOS 0.3 02/13/2023    BASO 0.08 02/13/2023    EOSINOPHIL 3.2 02/13/2023    BASOPHIL 1.0 02/13/2023        CMP  Lab Results   Component Value Date     02/13/2023    K 4.3 02/13/2023     02/13/2023    CO2 29 02/13/2023    GLU 97 02/13/2023    BUN 19 (H) 02/13/2023    CREATININE 0.73 02/13/2023    CALCIUM 9.2 02/13/2023    ANIONGAP 7 (L) 02/13/2023        Lab Results   Component Value Date    LABURIN No significant growth 03/27/2023            Results for orders placed or performed in visit on 02/22/23   EKG 12-lead    Collection Time: 02/22/23 10:18 AM    Narrative    Test Reason : R01.1,I45.10,    Vent. Rate : 056 BPM      Atrial Rate : 056 BPM     P-R Int : 194 ms          QRS Dur : 134 ms      QT Int : 428 ms       P-R-T Axes : 038 -57 019 degrees     QTc Int : 413 ms    Sinus bradycardia  Right bundle branch block  Left anterior fascicular block   Bifascicular block   Abnormal ECG  No previous ECGs available  Confirmed by Remberto Jesus MD ([id number]) on 2/22/2023 1:36:22 PM    Referred By: [email or web address]   SELF           Confirmed By:Remberto Jesus MD                  Plan:       Problem List Items Addressed This Visit          Cardiac/Vascular    Primary hypertension     Losartan 100 mg to continue.  Condition controlled.  She has normal renal function.    If her blood pressure is elevated I would consider adding hydrochlorothiazide or low-dose amlodipine for better blood pressure control.    Education provided to the patient and her daughter regarding optimal blood pressure readings.  I would accept readings in the 120-130 systolic range.         Abnormal electrocardiogram     She has right bundle branch block, left anterior fascicular block.  She is never had syncope or near-syncope.  Condition stable.         Aortic ejection murmur     Her exam findings have not changed.  Condition stable.  No significant aortic valve disease on echo, study date  March 2023.             I encouraged her to resume checking her blood pressures once a week or so.  Her blood pressure is mildly elevated today in my office.    A 6 month follow-up was advised.               Remberto Jesus MD  07/20/2023   10:34 AM

## 2023-07-20 NOTE — ASSESSMENT & PLAN NOTE
Losartan 100 mg to continue.  Condition controlled.  She has normal renal function.    If her blood pressure is elevated I would consider adding hydrochlorothiazide or low-dose amlodipine for better blood pressure control.    Education provided to the patient and her daughter regarding optimal blood pressure readings.  I would accept readings in the 120-130 systolic range.

## 2023-08-13 ENCOUNTER — PATIENT MESSAGE (OUTPATIENT)
Dept: CARDIOLOGY | Facility: CLINIC | Age: 83
End: 2023-08-13
Payer: MEDICARE

## 2023-08-14 DIAGNOSIS — I10 PRIMARY HYPERTENSION: Primary | ICD-10-CM

## 2023-08-14 RX ORDER — HYDROCHLOROTHIAZIDE 12.5 MG/1
12.5 TABLET ORAL DAILY
Qty: 90 TABLET | Refills: 4 | Status: SHIPPED | OUTPATIENT
Start: 2023-08-14 | End: 2024-01-03

## 2024-01-03 ENCOUNTER — OFFICE VISIT (OUTPATIENT)
Dept: CARDIOLOGY | Facility: CLINIC | Age: 84
End: 2024-01-03
Payer: MEDICARE

## 2024-01-03 VITALS
HEART RATE: 86 BPM | HEIGHT: 63 IN | RESPIRATION RATE: 18 BRPM | OXYGEN SATURATION: 94 % | BODY MASS INDEX: 29.41 KG/M2 | DIASTOLIC BLOOD PRESSURE: 86 MMHG | SYSTOLIC BLOOD PRESSURE: 124 MMHG | WEIGHT: 166 LBS

## 2024-01-03 DIAGNOSIS — R94.31 ABNORMAL ELECTROCARDIOGRAM: ICD-10-CM

## 2024-01-03 DIAGNOSIS — I10 PRIMARY HYPERTENSION: Primary | ICD-10-CM

## 2024-01-03 DIAGNOSIS — E78.5 HYPERLIPIDEMIA, UNSPECIFIED HYPERLIPIDEMIA TYPE: ICD-10-CM

## 2024-01-03 DIAGNOSIS — I35.1 AORTIC EJECTION MURMUR: ICD-10-CM

## 2024-01-03 PROCEDURE — 99999 PR PBB SHADOW E&M-EST. PATIENT-LVL III: CPT | Mod: PBBFAC,,, | Performed by: INTERNAL MEDICINE

## 2024-01-03 PROCEDURE — 99213 OFFICE O/P EST LOW 20 MIN: CPT | Mod: S$GLB,,, | Performed by: INTERNAL MEDICINE

## 2024-01-03 RX ORDER — LOSARTAN POTASSIUM AND HYDROCHLOROTHIAZIDE 12.5; 1 MG/1; MG/1
1 TABLET ORAL DAILY
Qty: 90 TABLET | Refills: 3 | Status: SHIPPED | OUTPATIENT
Start: 2024-01-03 | End: 2025-01-02

## 2024-01-03 RX ORDER — AMLODIPINE BESYLATE 2.5 MG/1
2.5 TABLET ORAL DAILY
Qty: 90 TABLET | Refills: 3 | Status: SHIPPED | OUTPATIENT
Start: 2024-01-03

## 2024-01-03 RX ORDER — OXYBUTYNIN CHLORIDE 15 MG/1
5 TABLET, EXTENDED RELEASE ORAL DAILY
COMMUNITY

## 2024-01-03 NOTE — PROGRESS NOTES
[facility] Cardiology     Subjective:    Patient ID:  Ricky Hassan is a 83 y.o. female who presents for follow-up of Hypertension and Conduction Disease    Review of patient's allergies indicates:   Allergen Reactions    Cipro [ciprofloxacin hcl] Itching    Vicodin [hydrocodone-acetaminophen] Hives    Adhesive Dermatitis     Long term adhesive sensitivity    Sulfa (sulfonamide antibiotics) Rash      She monitors her blood pressures at home.  Her echocardiogram February 2023 showed LVH with preserved LV function.  She has an ejection murmur without significant aortic valve disease.  She states her blood pressures have been better since adding hydrochlorothiazide to losartan.  She still has elevated readings intermittently.  It looks like at least have her readings are in the 120 systolic range.  Today's blood pressure 124/86 mm Hg.  She has no complaints of palpitations or chest pain.        Review of Systems   Constitutional: Negative for chills, decreased appetite, diaphoresis, fever, malaise/fatigue, night sweats, weight gain and weight loss.   HENT:  Negative for congestion, ear discharge, ear pain, hearing loss, hoarse voice, nosebleeds, odynophagia, sore throat, stridor and tinnitus.    Eyes:  Negative for blurred vision, discharge, double vision, pain, photophobia, redness, vision loss in left eye, vision loss in right eye, visual disturbance and visual halos.   Cardiovascular:  Negative for chest pain, claudication, cyanosis, dyspnea on exertion, irregular heartbeat, leg swelling, near-syncope, orthopnea, palpitations, paroxysmal nocturnal dyspnea and syncope.   Respiratory:  Negative for cough, hemoptysis, shortness of breath, sleep disturbances due to breathing, snoring, sputum production and wheezing.    Endocrine: Negative for cold intolerance, heat intolerance, polydipsia, polyphagia and polyuria.   Hematologic/Lymphatic:  "Negative for adenopathy and bleeding problem. Does not bruise/bleed easily.   Skin:  Negative for color change, dry skin, flushing, itching, nail changes, poor wound healing, rash, skin cancer, suspicious lesions and unusual hair distribution.   Musculoskeletal:  Negative for arthritis, back pain, falls, gout, joint pain, joint swelling, muscle cramps, muscle weakness, myalgias, neck pain and stiffness.   Gastrointestinal:  Negative for bloating, abdominal pain, anorexia, change in bowel habit, bowel incontinence, constipation, diarrhea, dysphagia, excessive appetite, flatus, heartburn, hematemesis, hematochezia, hemorrhoids, jaundice, melena, nausea and vomiting.   Genitourinary:  Negative for bladder incontinence, decreased libido, dysuria, flank pain, frequency, genital sores, hematuria, hesitancy, incomplete emptying, nocturia and urgency.   Neurological:  Negative for aphonia, brief paralysis, difficulty with concentration, disturbances in coordination, excessive daytime sleepiness, dizziness, focal weakness, headaches, light-headedness, loss of balance, numbness, paresthesias, seizures, sensory change, tremors, vertigo and weakness.   Psychiatric/Behavioral:  Negative for altered mental status, depression, hallucinations, memory loss, substance abuse, suicidal ideas and thoughts of violence. The patient does not have insomnia and is not nervous/anxious.    Allergic/Immunologic: Negative for hives and persistent infections.        Objective:       Vitals:    01/03/24 1032   BP: 124/86   Pulse: 86   Resp: 18   SpO2: (!) 94%   Weight: 75.3 kg (166 lb 0.1 oz)   Height: 5' 3" (1.6 m)    Physical Exam  Constitutional:       General: She is not in acute distress.     Appearance: She is well-developed. She is not diaphoretic.   HENT:      Head: Normocephalic and atraumatic.      Nose: Nose normal.   Eyes:      General: No scleral icterus.        Right eye: No discharge.      Conjunctiva/sclera: Conjunctivae normal.    "   Pupils: Pupils are equal, round, and reactive to light.   Neck:      Thyroid: No thyromegaly.      Vascular: No JVD.      Trachea: No tracheal deviation.   Cardiovascular:      Rate and Rhythm: Normal rate and regular rhythm.      Pulses:           Carotid pulses are 2+ on the right side and 2+ on the left side.       Radial pulses are 2+ on the right side and 2+ on the left side.        Dorsalis pedis pulses are 2+ on the right side and 2+ on the left side.        Posterior tibial pulses are 2+ on the right side and 2+ on the left side.      Heart sounds: Murmur heard.      Harsh early systolic murmur is present with a grade of 2/6 at the upper right sternal border.      No friction rub. No gallop.   Pulmonary:      Effort: Pulmonary effort is normal. No respiratory distress.      Breath sounds: Normal breath sounds. No stridor. No wheezing or rales.   Chest:      Chest wall: No tenderness.   Abdominal:      General: Bowel sounds are normal. There is no distension.      Palpations: Abdomen is soft. There is no mass.      Tenderness: There is no abdominal tenderness. There is no guarding or rebound.   Musculoskeletal:         General: No tenderness. Normal range of motion.      Cervical back: Normal range of motion and neck supple.   Lymphadenopathy:      Cervical: No cervical adenopathy.   Skin:     General: Skin is warm and dry.      Coloration: Skin is not pale.      Findings: No erythema or rash.   Neurological:      Mental Status: She is alert and oriented to person, place, and time.      Cranial Nerves: No cranial nerve deficit.      Coordination: Coordination normal.   Psychiatric:         Behavior: Behavior normal.         Thought Content: Thought content normal.         Judgment: Judgment normal.           Assessment:       1. Primary hypertension    2. Hyperlipidemia, unspecified hyperlipidemia type    3. Abnormal electrocardiogram    4. Aortic ejection murmur      Results for orders placed or performed  in visit on 03/27/23   Urine culture    Specimen: Urine, Clean Catch   Result Value Ref Range    Urine Culture, Routine No significant growth          Current Outpatient Medications:     celecoxib (CELEBREX) 100 MG capsule, Take 100 mg by mouth once daily., Disp: , Rfl:     fluticasone propionate (FLONASE) 50 mcg/actuation nasal spray, by Each Nostril route., Disp: , Rfl:     methenamine (HIPREX) 1 gram Tab, Take 1 tablet (1 g total) by mouth 2 (two) times daily., Disp: 180 tablet, Rfl: 3    oxybutynin (DITROPAN XL) 15 MG TR24, Take 5 mg by mouth once daily., Disp: , Rfl:     pantoprazole (PROTONIX) 40 MG tablet, Take 40 mg by mouth once daily., Disp: , Rfl:     amLODIPine (NORVASC) 2.5 MG tablet, Take 1 tablet (2.5 mg total) by mouth once daily., Disp: 90 tablet, Rfl: 3    gabapentin (NEURONTIN) 300 MG capsule, Take 300 mg by mouth., Disp: , Rfl:     losartan-hydrochlorothiazide 100-12.5 mg (HYZAAR) 100-12.5 mg Tab, Take 1 tablet by mouth once daily., Disp: 90 tablet, Rfl: 3     Lab Results   Component Value Date    WBC 7.85 02/13/2023    RBC 4.64 02/13/2023    HGB 14.2 02/13/2023    HCT 42.1 02/13/2023    MCV 91 02/13/2023    MCH 30.6 02/13/2023    MCHC 33.7 02/13/2023    RDW 13.6 02/13/2023     02/13/2023    MPV 11.1 02/13/2023    GRAN 5.0 02/13/2023    GRAN 63.6 02/13/2023    LYMPH 1.9 02/13/2023    LYMPH 24.1 02/13/2023    MONO 0.6 02/13/2023    MONO 8.0 02/13/2023    EOS 0.3 02/13/2023    BASO 0.08 02/13/2023    EOSINOPHIL 3.2 02/13/2023    BASOPHIL 1.0 02/13/2023        CMP  Lab Results   Component Value Date     02/13/2023    K 4.3 02/13/2023     02/13/2023    CO2 29 02/13/2023    GLU 97 02/13/2023    BUN 19 (H) 02/13/2023    CREATININE 0.73 02/13/2023    CALCIUM 9.2 02/13/2023    ANIONGAP 7 (L) 02/13/2023        Lab Results   Component Value Date    LABURIN No significant growth 03/27/2023            Results for orders placed or performed in visit on 02/22/23   EKG 12-lead    Collection  Time: 02/22/23 10:18 AM    Narrative    Test Reason : R01.1,I45.10,    Vent. Rate : 056 BPM     Atrial Rate : 056 BPM     P-R Int : 194 ms          QRS Dur : 134 ms      QT Int : 428 ms       P-R-T Axes : 038 -57 019 degrees     QTc Int : 413 ms    Sinus bradycardia  Right bundle branch block  Left anterior fascicular block   Bifascicular block   Abnormal ECG  No previous ECGs available  Confirmed by Edin AGUILAR, Remberto HOOD ([id number]) on 2/22/2023 1:36:22 PM    Referred By: MELANIE   SELF           Confirmed By:Remberto Jesus MD                  Plan:       Problem List Items Addressed This Visit          Cardiac/Vascular    Hyperlipidemia     No recent labs for review.  She was intolerant to statins in the past related to leg weakness.         Primary hypertension - Primary     Today, I added amlodipine 2.5 mg daily to her Hyzaar therapy for better blood pressure control.  Her readings have improved but are on the high side.  Side effects of leg swelling discussed although I do not think she will developed significant swelling on this dose.         Relevant Medications    losartan-hydrochlorothiazide 100-12.5 mg (HYZAAR) 100-12.5 mg Tab    amLODIPine (NORVASC) 2.5 MG tablet    Abnormal electrocardiogram     She has LAFB, RBBB.  No dizziness or syncope reported.  Condition stable.         Aortic ejection murmur     Condition stable.  No aortic stenosis of significance on echo.                 Amlodipine 2.5 mg added to her current regimen of treatment for hypertension at this time.  Side effects of leg swelling discussed.  I made a 1 year follow-up.  She is asymptomatic.  She will continue to monitor her blood pressures and report problems to me if needed.           Remberto Jesus MD  01/04/2024   10:52 AM

## 2024-01-04 NOTE — ASSESSMENT & PLAN NOTE
Today, I added amlodipine 2.5 mg daily to her Hyzaar therapy for better blood pressure control.  Her readings have improved but are on the high side.  Side effects of leg swelling discussed although I do not think she will developed significant swelling on this dose.

## 2024-03-15 ENCOUNTER — TELEPHONE (OUTPATIENT)
Dept: UROLOGY | Facility: CLINIC | Age: 84
End: 2024-03-15
Payer: MEDICARE

## 2024-03-15 RX ORDER — METHENAMINE HIPPURATE 1000 MG/1
1 TABLET ORAL 2 TIMES DAILY
Qty: 180 TABLET | Refills: 3 | Status: SHIPPED | OUTPATIENT
Start: 2024-03-15 | End: 2025-03-15

## 2024-03-15 NOTE — TELEPHONE ENCOUNTER
----- Message from Ester Jones sent at 3/15/2024  3:27 PM CDT -----  Type:  RX Refill Request    Who Called: pt daughter  Refill or New Rx:refill  RX Name and Strength:methenam hip 1g  Preferred Pharmacy with phone number:[phone] - [place], [place] - [address] [facility]  Local or Mail Order:local  Ordering Provider:joel  Would the patient rather a call back or a response via [facility]? call  Best Call Back Number: [phone]  Additional Information:

## 2024-10-07 ENCOUNTER — TELEPHONE (OUTPATIENT)
Dept: CARDIOLOGY | Facility: CLINIC | Age: 84
End: 2024-10-07
Payer: MEDICARE

## 2024-10-07 NOTE — TELEPHONE ENCOUNTER
----- Message from Remberto Jesus MD sent at 10/7/2024  9:08 AM CDT -----  Contact: KAREN NOVA  Cleared cardiac wise. Sorry for any delays, today is the first request I have seen  ----- Message -----  From: Tammie Josue MA  Sent: 10/7/2024   8:07 AM CDT  To: Remberto Jesus MD      ----- Message -----  From: Carmen Degroot  Sent: 10/4/2024   1:09 PM CDT  To: Edin Alicia    Ricky Hassan  MRN: [id number]  : 1940  PCP: Corey Valdes  Home Phone      [phone]  Work Phone      Not on file.  Mobile          [phone]      MESSAGE: Karen is following up on a cardiac clearance that she sent on 10/01/24 for a surgery the patient is having on 10/08/24.  She will be leaving the office for the afternoon but is needing a response ASAP.        Phone: [phone]

## 2024-12-03 DIAGNOSIS — I10 PRIMARY HYPERTENSION: ICD-10-CM

## 2024-12-03 RX ORDER — METHENAMINE HIPPURATE 1000 MG/1
1 TABLET ORAL 2 TIMES DAILY
Qty: 180 TABLET | Refills: 3 | Status: SHIPPED | OUTPATIENT
Start: 2024-12-03 | End: 2025-12-03

## 2024-12-04 RX ORDER — LOSARTAN POTASSIUM AND HYDROCHLOROTHIAZIDE 12.5; 1 MG/1; MG/1
1 TABLET ORAL DAILY
Qty: 90 TABLET | Refills: 3 | Status: SHIPPED | OUTPATIENT
Start: 2024-12-04 | End: 2025-12-04

## 2024-12-04 RX ORDER — AMLODIPINE BESYLATE 2.5 MG/1
2.5 TABLET ORAL DAILY
Qty: 90 TABLET | Refills: 3 | Status: SHIPPED | OUTPATIENT
Start: 2024-12-04

## 2025-02-25 ENCOUNTER — OFFICE VISIT (OUTPATIENT)
Dept: UROLOGY | Facility: CLINIC | Age: 85
End: 2025-02-25
Payer: MEDICARE

## 2025-02-25 VITALS
TEMPERATURE: 98 F | BODY MASS INDEX: 32.3 KG/M2 | HEART RATE: 76 BPM | OXYGEN SATURATION: 96 % | HEIGHT: 63 IN | SYSTOLIC BLOOD PRESSURE: 141 MMHG | RESPIRATION RATE: 18 BRPM | DIASTOLIC BLOOD PRESSURE: 79 MMHG | WEIGHT: 182.31 LBS

## 2025-02-25 DIAGNOSIS — R39.15 URINARY URGENCY: ICD-10-CM

## 2025-02-25 DIAGNOSIS — R35.1 NOCTURIA: ICD-10-CM

## 2025-02-25 DIAGNOSIS — N30.00 ACUTE CYSTITIS WITHOUT HEMATURIA: Primary | ICD-10-CM

## 2025-02-25 DIAGNOSIS — N30.80 CYSTITIS CYSTICA: ICD-10-CM

## 2025-02-25 DIAGNOSIS — N39.41 URINARY INCONTINENCE, URGE: ICD-10-CM

## 2025-02-25 DIAGNOSIS — Z87.440 HISTORY OF RECURRENT UTIS: ICD-10-CM

## 2025-02-25 PROCEDURE — 1101F PT FALLS ASSESS-DOCD LE1/YR: CPT | Mod: CPTII,S$GLB,, | Performed by: NURSE PRACTITIONER

## 2025-02-25 PROCEDURE — 99214 OFFICE O/P EST MOD 30 MIN: CPT | Mod: 25,S$GLB,, | Performed by: NURSE PRACTITIONER

## 2025-02-25 PROCEDURE — 1157F ADVNC CARE PLAN IN RCRD: CPT | Mod: CPTII,S$GLB,, | Performed by: NURSE PRACTITIONER

## 2025-02-25 PROCEDURE — 3078F DIAST BP <80 MM HG: CPT | Mod: CPTII,S$GLB,, | Performed by: NURSE PRACTITIONER

## 2025-02-25 PROCEDURE — 1159F MED LIST DOCD IN RCRD: CPT | Mod: CPTII,S$GLB,, | Performed by: NURSE PRACTITIONER

## 2025-02-25 PROCEDURE — 3077F SYST BP >= 140 MM HG: CPT | Mod: CPTII,S$GLB,, | Performed by: NURSE PRACTITIONER

## 2025-02-25 PROCEDURE — 1126F AMNT PAIN NOTED NONE PRSNT: CPT | Mod: CPTII,S$GLB,, | Performed by: NURSE PRACTITIONER

## 2025-02-25 PROCEDURE — 3288F FALL RISK ASSESSMENT DOCD: CPT | Mod: CPTII,S$GLB,, | Performed by: NURSE PRACTITIONER

## 2025-02-25 PROCEDURE — 99999 PR PBB SHADOW E&M-EST. PATIENT-LVL V: CPT | Mod: PBBFAC,,, | Performed by: NURSE PRACTITIONER

## 2025-02-25 PROCEDURE — 96372 THER/PROPH/DIAG INJ SC/IM: CPT | Mod: S$GLB,,, | Performed by: NURSE PRACTITIONER

## 2025-02-25 PROCEDURE — 1160F RVW MEDS BY RX/DR IN RCRD: CPT | Mod: CPTII,S$GLB,, | Performed by: NURSE PRACTITIONER

## 2025-02-25 RX ORDER — FLUCONAZOLE 150 MG/1
150 TABLET ORAL DAILY
Qty: 1 TABLET | Refills: 0 | Status: SHIPPED | OUTPATIENT
Start: 2025-02-25 | End: 2025-02-26

## 2025-02-25 RX ORDER — AMOXICILLIN AND CLAVULANATE POTASSIUM 875; 125 MG/1; MG/1
1 TABLET, FILM COATED ORAL 2 TIMES DAILY
COMMUNITY

## 2025-02-25 RX ORDER — TROSPIUM CHLORIDE 20 MG/1
20 TABLET, FILM COATED ORAL 2 TIMES DAILY
Qty: 60 TABLET | Refills: 11 | Status: SHIPPED | OUTPATIENT
Start: 2025-02-25 | End: 2026-02-25

## 2025-02-25 RX ORDER — CEFTRIAXONE 500 MG/1
500 INJECTION, POWDER, FOR SOLUTION INTRAMUSCULAR; INTRAVENOUS
Status: COMPLETED | OUTPATIENT
Start: 2025-02-25 | End: 2025-02-25

## 2025-02-25 RX ORDER — CEFDINIR 300 MG/1
300 CAPSULE ORAL 2 TIMES DAILY
COMMUNITY
End: 2025-02-25 | Stop reason: ALTCHOICE

## 2025-02-25 RX ORDER — NITROFURANTOIN 25; 75 MG/1; MG/1
100 CAPSULE ORAL 2 TIMES DAILY
COMMUNITY

## 2025-02-25 RX ADMIN — CEFTRIAXONE 500 MG: 500 INJECTION, POWDER, FOR SOLUTION INTRAMUSCULAR; INTRAVENOUS at 02:02

## 2025-02-25 NOTE — PATIENT INSTRUCTIONS
Administer rocephin 500 mg IM today for UTI.   U/A and urine cx (home collect after completion of antibiotic therapy; drop urine specimen off to any [facility] outpatient lab).  Continue taking Augmentin antibiotic as prescribed by another provider previously.   Start trial of trospium (Sanctura) 20 mg twice daily for urinary urgency with leakage. Take 1 pill every morning and 1 pill every evening.  Continue taking methenamine (Hiprex) 1 gram twice daily for UTI prevention.   Continue using estrace vaginal cream twice weekly for UTI prevention and vaginal atrophy.  Follow-up pending urine cx results.

## 2025-02-25 NOTE — PROGRESS NOTES
Subjective:       Patient ID: Ricky Hassan is a 84 y.o. female.    Chief Complaint: Urinary Tract Infection (Following up )    History of Present Illness    CHIEF COMPLAINT:  Patient presents today for urinary tract infection. Her daughter is present during visit.     CURRENT UTI TREATMENT:  She was initially prescribed Macrobid which was ineffective, followed by Omnicef, and currently on Augmentin which has provided some symptom improvement. She used AZO for pain relief during acute symptoms.    CURRENT URINARY SYMPTOMS:  She reports bladder pain and pressure with urinary urgency and leakage. She experiences frequent urination every 2 hours at night and has difficulty reaching the bathroom before voiding. She denies blood in urine. Previous trial of oxybutynin for urgency was discontinued due to lack of efficacy.    MEDICAL HISTORY:  She has a history of recurrent urinary tract infections. 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 She had back surgery in October, after which she developed increased urinary frequency and difficulty with bladder control.    MEDICATIONS:  She was using estrace cream twice weekly but discontinued after her back surgery in October. She reports she is currently back using estrace cream twice weekly. She takes an OTC yeast cleanse product for yeast infections. Patient is also taking methenamine 1 gram BID for UTI prevention.     ALLERGIES:  She has documented allergies to Adhesive, Vicodin, Ciprofloxacin, Levofloxacin, and Sulfamethoxazole/Trimethoprim.           Objective:      Physical Exam  Vitals and nursing note reviewed.   Constitutional:       General: She is not in acute distress.     Appearance: She is well-developed. She is obese. She is not ill-appearing.   HENT:      Head: Normocephalic and atraumatic.   Eyes:      Pupils: Pupils are equal, round, and reactive to light.   Cardiovascular:       Rate and Rhythm: Normal rate.   Pulmonary:      Effort: Pulmonary effort is normal. No respiratory distress.   Abdominal:      Palpations: Abdomen is soft.      Tenderness: There is no abdominal tenderness.   Musculoskeletal:         General: Normal range of motion.      Cervical back: Normal range of motion.   Skin:     General: Skin is warm and dry.   Neurological:      Mental Status: She is alert and oriented to person, place, and time.      Coordination: Coordination normal.   Psychiatric:         Mood and Affect: Mood normal.         Behavior: Behavior normal.         Thought Content: Thought content normal.         Judgment: Judgment normal.         Assessment:       Problem List Items Addressed This Visit       History of recurrent UTIs    Cystitis cystica     Other Visit Diagnoses         Acute cystitis without hematuria    -  Primary    Relevant Medications    fluconazole (DIFLUCAN) 150 MG Tab    cefTRIAXone injection 500 mg (Completed)    Other Relevant Orders    Urinalysis    Urine Culture High Risk      Urinary urgency        Relevant Medications    trospium (SANCTURA) 20 mg Tab tablet      Urinary incontinence, urge        Relevant Medications    trospium (SANCTURA) 20 mg Tab tablet      Nocturia        Relevant Medications    trospium (SANCTURA) 20 mg Tab tablet            Plan:           Ricky was seen today for urinary tract infection.    Diagnoses and all orders for this visit:    Acute cystitis without hematuria  -     fluconazole (DIFLUCAN) 150 MG Tab; Take 1 tablet (150 mg total) by mouth once daily. for 1 day  -     cefTRIAXone injection 500 mg  -     Urinalysis; Future  -     Urine Culture High Risk; Future    Urinary urgency  -     trospium (SANCTURA) 20 mg Tab tablet; Take 1 tablet (20 mg total) by mouth 2 (two) times daily.    Urinary incontinence, urge  -     trospium (SANCTURA) 20 mg Tab tablet; Take 1 tablet (20 mg total) by mouth 2 (two) times daily.    Nocturia  -     trospium  (SANCTURA) 20 mg Tab tablet; Take 1 tablet (20 mg total) by mouth 2 (two) times daily.    Cystitis cystica    History of recurrent UTIs    Other orders  Administer rocephin 500 mg IM today for UTI.   U/A and urine cx (home collect after completion of antibiotic therapy; drop urine specimen off to any [facility] outpatient lab).  Continue taking Augmentin antibiotic as prescribed by another provider previously.   Start trial of trospium (Sanctura) 20 mg twice daily for urinary urgency with leakage. Take 1 pill every morning and 1 pill every evening.  Continue taking methenamine (Hiprex) 1 gram twice daily for UTI prevention.   Continue using estrace vaginal cream twice weekly for UTI prevention and vaginal atrophy.    Follow-up pending urine cx results.     This note was generated with the assistance of ambient listening technology. Verbal consent was obtained by the patient and accompanying visitor(s) for the recording of patient appointment to facilitate this note. I attest to having reviewed and edited the generated note for accuracy, though some syntax or spelling errors may persist. Please contact the author of this note for any clarification.      Lisa Dorantes, DNP

## 2025-02-25 NOTE — PROGRESS NOTES
Allergies checked. Patient given Ceftriaxone 1 gm IM mixed with 2% lidocaine in right upper outer quad of right gluteus.

## 2025-03-04 ENCOUNTER — LAB VISIT (OUTPATIENT)
Dept: LAB | Facility: HOSPITAL | Age: 85
End: 2025-03-04
Attending: NURSE PRACTITIONER
Payer: MEDICARE

## 2025-03-04 DIAGNOSIS — N30.00 ACUTE CYSTITIS WITHOUT HEMATURIA: ICD-10-CM

## 2025-03-04 LAB
BILIRUB UR QL STRIP: NEGATIVE
CLARITY UR: CLEAR
COLOR UR: YELLOW
GLUCOSE UR QL STRIP: NEGATIVE
HGB UR QL STRIP: NEGATIVE
KETONES UR QL STRIP: NEGATIVE
LEUKOCYTE ESTERASE UR QL STRIP: NEGATIVE
NITRITE UR QL STRIP: NEGATIVE
PH UR STRIP: 6 [PH] (ref 5–8)
PROT UR QL STRIP: NEGATIVE
SP GR UR STRIP: 1.01 (ref 1–1.03)
URN SPEC COLLECT METH UR: NORMAL
UROBILINOGEN UR STRIP-ACNC: NEGATIVE EU/DL

## 2025-03-04 PROCEDURE — 87086 URINE CULTURE/COLONY COUNT: CPT | Performed by: NURSE PRACTITIONER

## 2025-03-04 PROCEDURE — 81003 URINALYSIS AUTO W/O SCOPE: CPT | Performed by: NURSE PRACTITIONER

## 2025-03-04 PROCEDURE — 87186 SC STD MICRODIL/AGAR DIL: CPT | Performed by: NURSE PRACTITIONER

## 2025-03-04 PROCEDURE — 87077 CULTURE AEROBIC IDENTIFY: CPT | Performed by: NURSE PRACTITIONER

## 2025-03-04 PROCEDURE — 87088 URINE BACTERIA CULTURE: CPT | Performed by: NURSE PRACTITIONER

## 2025-03-06 LAB — BACTERIA UR CULT: ABNORMAL

## 2025-03-07 ENCOUNTER — RESULTS FOLLOW-UP (OUTPATIENT)
Dept: UROLOGY | Facility: CLINIC | Age: 85
End: 2025-03-07
Payer: MEDICARE

## 2025-03-07 DIAGNOSIS — N30.00 ACUTE CYSTITIS WITHOUT HEMATURIA: Primary | ICD-10-CM

## 2025-03-07 RX ORDER — NITROFURANTOIN 25; 75 MG/1; MG/1
100 CAPSULE ORAL 2 TIMES DAILY
Qty: 28 CAPSULE | Refills: 0 | Status: SHIPPED | OUTPATIENT
Start: 2025-03-07 | End: 2025-03-21

## 2025-03-07 NOTE — TELEPHONE ENCOUNTER
----- Message from Lisa Dorantes DNP sent at 3/7/2025  2:52 PM CST -----  Please inform patient via telephone that her urine cx came back positive for a UTI. Script for Macrobid sent to [facility]. Follow-up in 3 weeks. This can be virtual or in-person.   ----- Message -----  From: Jean-Paul, Soft Lab Interface  Sent: 3/4/2025  10:06 AM CST  To: Lisa Dorantes DNP

## 2025-03-07 NOTE — PROGRESS NOTES
Diagnoses and all orders for this visit:    Acute cystitis without hematuria  -     nitrofurantoin, macrocrystal-monohydrate, (MACROBID) 100 MG capsule; Take 1 capsule (100 mg total) by mouth 2 (two) times daily. for 14 days    Follow-up in 3 weeks for evaluation of new OAB medication and UTI.     Lisa Dorantes, DNP

## 2025-03-07 NOTE — TELEPHONE ENCOUNTER
Called and left VM with result note and plan of care, left my callback number for questions. Notified that Bactrim called into  [facility]. Also sent [employer] message.

## 2025-03-18 ENCOUNTER — TELEPHONE (OUTPATIENT)
Dept: UROLOGY | Facility: CLINIC | Age: 85
End: 2025-03-18

## 2025-03-18 ENCOUNTER — RESULTS FOLLOW-UP (OUTPATIENT)
Dept: UROLOGY | Facility: CLINIC | Age: 85
End: 2025-03-18

## 2025-03-18 ENCOUNTER — OFFICE VISIT (OUTPATIENT)
Dept: UROLOGY | Facility: CLINIC | Age: 85
End: 2025-03-18
Payer: MEDICARE

## 2025-03-18 VITALS
BODY MASS INDEX: 31.79 KG/M2 | SYSTOLIC BLOOD PRESSURE: 144 MMHG | WEIGHT: 179.44 LBS | HEART RATE: 75 BPM | DIASTOLIC BLOOD PRESSURE: 81 MMHG

## 2025-03-18 DIAGNOSIS — R10.30 LOWER ABDOMINAL PAIN: Primary | ICD-10-CM

## 2025-03-18 DIAGNOSIS — R35.1 NOCTURIA: ICD-10-CM

## 2025-03-18 DIAGNOSIS — N30.00 ACUTE CYSTITIS WITHOUT HEMATURIA: ICD-10-CM

## 2025-03-18 DIAGNOSIS — Z87.442 HISTORY OF NEPHROLITHIASIS: ICD-10-CM

## 2025-03-18 DIAGNOSIS — Z87.440 HISTORY OF RECURRENT UTIS: ICD-10-CM

## 2025-03-18 DIAGNOSIS — R39.15 URINARY URGENCY: ICD-10-CM

## 2025-03-18 DIAGNOSIS — R39.14 FEELING OF INCOMPLETE BLADDER EMPTYING: ICD-10-CM

## 2025-03-18 LAB
BILIRUB UR QL STRIP: NEGATIVE
CLARITY UR REFRACT.AUTO: CLEAR
COLOR UR AUTO: YELLOW
GLUCOSE UR QL STRIP: NEGATIVE
HGB UR QL STRIP: NEGATIVE
KETONES UR QL STRIP: NEGATIVE
LEUKOCYTE ESTERASE UR QL STRIP: NEGATIVE
NITRITE UR QL STRIP: NEGATIVE
PH UR STRIP: 7 [PH] (ref 5–8)
POC RESIDUAL URINE VOLUME: 43 ML (ref 0–100)
PROT UR QL STRIP: NEGATIVE
SP GR UR STRIP: 1.01 (ref 1–1.03)
URN SPEC COLLECT METH UR: NORMAL

## 2025-03-18 PROCEDURE — 99999 PR PBB SHADOW E&M-EST. PATIENT-LVL III: CPT | Mod: PBBFAC,,, | Performed by: NURSE PRACTITIONER

## 2025-03-18 PROCEDURE — 99999PBSHW POCT BLADDER SCAN: Mod: PBBFAC,,,

## 2025-03-18 PROCEDURE — 81003 URINALYSIS AUTO W/O SCOPE: CPT | Performed by: NURSE PRACTITIONER

## 2025-03-18 PROCEDURE — 87086 URINE CULTURE/COLONY COUNT: CPT | Performed by: NURSE PRACTITIONER

## 2025-03-18 PROCEDURE — 99213 OFFICE O/P EST LOW 20 MIN: CPT | Mod: PBBFAC,PO | Performed by: NURSE PRACTITIONER

## 2025-03-18 PROCEDURE — 1157F ADVNC CARE PLAN IN RCRD: CPT | Mod: ,,, | Performed by: NURSE PRACTITIONER

## 2025-03-18 PROCEDURE — 51798 US URINE CAPACITY MEASURE: CPT | Mod: PBBFAC,PO | Performed by: NURSE PRACTITIONER

## 2025-03-18 PROCEDURE — 99214 OFFICE O/P EST MOD 30 MIN: CPT | Mod: S$PBB,,, | Performed by: NURSE PRACTITIONER

## 2025-03-18 RX ORDER — ESTRADIOL 0.1 MG/G
1 CREAM VAGINAL
COMMUNITY
Start: 2024-11-22

## 2025-03-18 NOTE — PROGRESS NOTES
Subjective:       Patient ID: Ricky Hassan is a 84 y.o. female.    Chief Complaint: No chief complaint on file.    History of Present Illness    CHIEF COMPLAINT:  Patient presents today with abdominal pain and urinary symptoms. Patient is here today with her daughter.    CURRENT SYMPTOMS:  She woke up at 2am with abdominal pain requiring Tylenol before bed and again at 3am for pain management. Current pain is rated 5/10, described as pressure and heaviness sensation in the abdomen. She reports mild nausea without vomiting and back pain, though continues to attempt exercise despite discomfort. She also notes heaviness in her legs with weakness, similar to a previous UTI.    URINARY SYMPTOMS:  She reports incomplete bladder emptying with post-void leakage and urinary frequency every 2 hours during both day and night. Current discomfort level is rated 5/10. She is currently on antibiotic (Macrobid) for UTI with 3.5 days of treatment remaining.    MEDICAL HISTORY:  She has a history of kidney stones with last occurrence two years ago requiring procedure. She also has history of recurring urinary tract infections, with most recent infection treated with antibiotics for approximately one month.    CURRENT MEDICATIONS:  She takes Trospium twice daily for overactive bladder.    DIET:  She drinks four bottles of water daily with cranberry juice and consumes yogurt at lunchtime.      ROS:  General: -fever, -chills, -fatigue, -weight gain, -weight loss  Eyes: -vision changes, -redness, -discharge  ENT: -ear pain, -nasal congestion, -sore throat  Cardiovascular: -chest pain, -palpitations, -lower extremity edema  Respiratory: -cough, -shortness of breath  Gastrointestinal: +abdominal pain, +nausea, -vomiting, -diarrhea, -constipation, -blood in stool, +abdominal distention  Genitourinary: -dysuria, -hematuria, +frequency, +urinary incontinence, +incomplete emptying  Musculoskeletal: -joint pain, -muscle pain, +muscle  weakness, +back pain  Skin: -rash, -lesion  Neurological: -headache, -dizziness, -numbness, -tingling  Psychiatric: -anxiety, -depression, -sleep difficulty           Objective:      Physical Exam  Vitals and nursing note reviewed.   Constitutional:       General: She is not in acute distress.     Appearance: She is well-developed. She is obese. She is not ill-appearing.   HENT:      Head: Normocephalic and atraumatic.   Eyes:      Pupils: Pupils are equal, round, and reactive to light.   Cardiovascular:      Rate and Rhythm: Normal rate.   Pulmonary:      Effort: Pulmonary effort is normal. No respiratory distress.   Abdominal:      Palpations: Abdomen is soft.      Tenderness: There is no abdominal tenderness.   Genitourinary:     Comments: PVR = 43 mL  Musculoskeletal:      Cervical back: Normal range of motion.   Skin:     General: Skin is warm and dry.   Neurological:      Mental Status: She is alert and oriented to person, place, and time.      Coordination: Coordination normal.   Psychiatric:         Mood and Affect: Mood normal.         Behavior: Behavior normal.         Thought Content: Thought content normal.         Judgment: Judgment normal.         Assessment:       Problem List Items Addressed This Visit       History of recurrent UTIs    Relevant Orders    POCT Bladder Scan    Urinalysis    Urine Culture High Risk     Other Visit Diagnoses         Lower abdominal pain    -  Primary    Relevant Orders    POCT Bladder Scan    CT Renal Stone Study ABD Pelvis WO    Urinalysis    Urine Culture High Risk      Acute cystitis without hematuria        Relevant Orders    POCT Bladder Scan    Urinalysis    Urine Culture High Risk      History of nephrolithiasis        Relevant Orders    POCT Bladder Scan    CT Renal Stone Study ABD Pelvis WO    Urinalysis    Urine Culture High Risk      Feeling of incomplete bladder emptying        Relevant Orders    POCT Bladder Scan    Urinalysis    Urine Culture High Risk       Nocturia        Relevant Orders    POCT Bladder Scan    Urinalysis    Urine Culture High Risk      Urinary urgency        Relevant Orders    POCT Bladder Scan    Urinalysis    Urine Culture High Risk            Plan:           Diagnoses and all orders for this visit:    Lower abdominal pain  -     POCT Bladder Scan  -     CT Renal Stone Study ABD Pelvis WO; Future  -     Urinalysis  -     Urine Culture High Risk    Acute cystitis without hematuria  -     POCT Bladder Scan  -     Urinalysis  -     Urine Culture High Risk    History of recurrent UTIs  -     POCT Bladder Scan  -     Urinalysis  -     Urine Culture High Risk    History of nephrolithiasis  -     POCT Bladder Scan  -     CT Renal Stone Study ABD Pelvis WO; Future  -     Urinalysis  -     Urine Culture High Risk    Feeling of incomplete bladder emptying  -     POCT Bladder Scan  -     Urinalysis  -     Urine Culture High Risk    Nocturia  -     POCT Bladder Scan  -     Urinalysis  -     Urine Culture High Risk    Urinary urgency  -     POCT Bladder Scan  -     Urinalysis  -     Urine Culture High Risk    NOTE: May consider decreasing trospium to 20 mg daily instead of BID if urine cx and CT is normal.     Follow-up pending urine cx and CT results.     This note was generated with the assistance of ambient listening technology. Verbal consent was obtained by the patient and accompanying visitor(s) for the recording of patient appointment to facilitate this note. I attest to having reviewed and edited the generated note for accuracy, though some syntax or spelling errors may persist. Please contact the author of this note for any clarification.      Lisa Dorantes, DNP

## 2025-03-18 NOTE — TELEPHONE ENCOUNTER
----- Message from Lisa Dorantes DNP sent at 3/18/2025  4:25 PM CDT -----  Please inform patient via telephone that her CT RSS showed a simple right renal cyst (fluid filled sac; benign finding). No urinary tract stones noted. No identifiable cause noted for patient's lower   abdominal pain. Will f/u with patient once I receive urine cx results. CT did show patient's gallbladder has a small layering of stones vs sludge. Can f/u with PCP if experience epigastric pain,   right upper abdominal pain, or pain near right shoulder blade.   ----- Message -----  From: Interface, Rad Results In  Sent: 3/18/2025   3:33 PM CDT  To: Lisa Dorantes DNP

## 2025-03-18 NOTE — TELEPHONE ENCOUNTER
I called and gave patient result note, interpretation and provider plan of care. Patient verbalized understanding of everything discussed. I also sent via [employer] per patient's daughter's request.

## 2025-03-18 NOTE — PATIENT INSTRUCTIONS
PVR bladder scan today  CT RSS (STAT)  U/A and urine cx today  Follow-up pending urine cx and CT results.

## 2025-03-20 LAB — BACTERIA UR CULT: NO GROWTH

## 2025-03-24 ENCOUNTER — TELEPHONE (OUTPATIENT)
Dept: UROLOGY | Facility: CLINIC | Age: 85
End: 2025-03-24
Payer: MEDICARE

## 2025-03-24 ENCOUNTER — PATIENT MESSAGE (OUTPATIENT)
Dept: UROLOGY | Facility: CLINIC | Age: 85
End: 2025-03-24
Payer: MEDICARE

## 2025-03-24 DIAGNOSIS — N39.41 URINARY INCONTINENCE, URGE: ICD-10-CM

## 2025-03-24 DIAGNOSIS — R35.1 NOCTURIA: ICD-10-CM

## 2025-03-24 DIAGNOSIS — R10.30 LOWER ABDOMINAL PAIN: Primary | ICD-10-CM

## 2025-03-24 DIAGNOSIS — Z87.440 HISTORY OF RECURRENT UTIS: ICD-10-CM

## 2025-03-24 DIAGNOSIS — R39.14 FEELING OF INCOMPLETE BLADDER EMPTYING: ICD-10-CM

## 2025-03-24 DIAGNOSIS — R39.15 URINARY URGENCY: ICD-10-CM

## 2025-03-24 NOTE — TELEPHONE ENCOUNTER
----- Message from Carmina sent at 3/24/2025  1:42 PM CDT -----  Regarding: Call back  Contact: [phone]  Who Called: PT Patient is calling to get orders for a urinalysis and a culture for tomorrow. Please advice

## 2025-03-24 NOTE — TELEPHONE ENCOUNTER
Called to let daughter know that orders where up in. She verbally understood and stated that they would get them done tomorrow.

## 2025-03-25 ENCOUNTER — LAB VISIT (OUTPATIENT)
Dept: LAB | Facility: HOSPITAL | Age: 85
End: 2025-03-25
Attending: NURSE PRACTITIONER
Payer: MEDICARE

## 2025-03-25 DIAGNOSIS — R35.1 NOCTURIA: ICD-10-CM

## 2025-03-25 DIAGNOSIS — Z87.440 HISTORY OF RECURRENT UTIS: ICD-10-CM

## 2025-03-25 DIAGNOSIS — R39.14 FEELING OF INCOMPLETE BLADDER EMPTYING: ICD-10-CM

## 2025-03-25 DIAGNOSIS — R39.15 URINARY URGENCY: ICD-10-CM

## 2025-03-25 DIAGNOSIS — R10.30 LOWER ABDOMINAL PAIN: ICD-10-CM

## 2025-03-25 DIAGNOSIS — N39.41 URINARY INCONTINENCE, URGE: ICD-10-CM

## 2025-03-25 LAB
BILIRUB UR QL STRIP.AUTO: NEGATIVE
CLARITY UR: CLEAR
COLOR UR AUTO: YELLOW
GLUCOSE UR QL STRIP: NEGATIVE
HGB UR QL STRIP: NEGATIVE
KETONES UR QL STRIP: NEGATIVE
LEUKOCYTE ESTERASE UR QL STRIP: NEGATIVE
NITRITE UR QL STRIP: NEGATIVE
PH UR STRIP: 7 [PH]
PROT UR QL STRIP: NEGATIVE
SP GR UR STRIP: 1.01
UROBILINOGEN UR STRIP-ACNC: NEGATIVE EU/DL

## 2025-03-25 PROCEDURE — 81003 URINALYSIS AUTO W/O SCOPE: CPT

## 2025-03-28 ENCOUNTER — TELEPHONE (OUTPATIENT)
Dept: UROLOGY | Facility: CLINIC | Age: 85
End: 2025-03-28
Payer: MEDICARE

## 2025-03-28 ENCOUNTER — LAB VISIT (OUTPATIENT)
Dept: LAB | Facility: HOSPITAL | Age: 85
End: 2025-03-28
Attending: NURSE PRACTITIONER
Payer: MEDICARE

## 2025-03-28 DIAGNOSIS — R30.0 DYSURIA: ICD-10-CM

## 2025-03-28 DIAGNOSIS — N30.00 ACUTE CYSTITIS WITHOUT HEMATURIA: ICD-10-CM

## 2025-03-28 DIAGNOSIS — N30.80 CYSTITIS CYSTICA: Primary | ICD-10-CM

## 2025-03-28 DIAGNOSIS — N30.80 CYSTITIS CYSTICA: ICD-10-CM

## 2025-03-28 DIAGNOSIS — R10.30 LOWER ABDOMINAL PAIN: Primary | ICD-10-CM

## 2025-03-28 PROCEDURE — 87088 URINE BACTERIA CULTURE: CPT

## 2025-03-28 RX ORDER — AMPICILLIN 500 MG/1
500 CAPSULE ORAL 4 TIMES DAILY
Qty: 28 CAPSULE | Refills: 0 | Status: SHIPPED | OUTPATIENT
Start: 2025-03-28 | End: 2025-04-04

## 2025-03-28 RX ORDER — KETOROLAC TROMETHAMINE 10 MG/1
10 TABLET, FILM COATED ORAL EVERY 6 HOURS PRN
Qty: 20 TABLET | Refills: 0 | Status: SHIPPED | OUTPATIENT
Start: 2025-03-28 | End: 2025-04-02

## 2025-03-28 RX ORDER — FLUCONAZOLE 150 MG/1
150 TABLET ORAL DAILY
Qty: 1 TABLET | Refills: 0 | Status: SHIPPED | OUTPATIENT
Start: 2025-03-28 | End: 2025-03-29

## 2025-03-28 NOTE — TELEPHONE ENCOUNTER
I spoke with pt's daughter Vanessa and the patient on speaker phone. Patient had not stopped the trospium but she will starting today. I let them know that the patient can take ampicllin and diflucan at the same time, per Lisa Dorantes. They verbalized understanding of everything discussed. She will be dropping off urine culture today then will begin abx.

## 2025-03-28 NOTE — TELEPHONE ENCOUNTER
"Called and spoke with pt's daughter Vanessa regarding recollecting a urine culture that was not performed by the lab. Vanessa states the patient is "miserable" with lower abdominal pain and burning with urination, AZO not helping. She requests abx in the interim while waiting for repeat cx results (she will drop off today). [facility] in [facility]) preferred pharmacy.  "

## 2025-04-01 ENCOUNTER — RESULTS FOLLOW-UP (OUTPATIENT)
Dept: UROLOGY | Facility: CLINIC | Age: 85
End: 2025-04-01

## 2025-04-01 ENCOUNTER — TELEPHONE (OUTPATIENT)
Dept: UROLOGY | Facility: CLINIC | Age: 85
End: 2025-04-01
Payer: MEDICARE

## 2025-04-01 DIAGNOSIS — N30.80 CYSTITIS CYSTICA: ICD-10-CM

## 2025-04-01 DIAGNOSIS — Z87.440 HISTORY OF RECURRENT UTIS: Primary | ICD-10-CM

## 2025-04-01 LAB — BACTERIA UR CULT: ABNORMAL

## 2025-04-01 NOTE — TELEPHONE ENCOUNTER
----- Message from Lisa Dorantes DNP sent at 4/1/2025  2:52 PM CDT -----  Please inform patient's daughter/caregiver that patient's urine cx came back positive for a UTI. Continue taking ampicillin as prescribed. It is sensitive to bacteria found on urine cx. Repeat urine   cx after completion of antibiotic therapy. Please arrange and schedule lab appt next week. Thanks.   ----- Message -----  From: Lab, Background User  Sent: 3/30/2025  11:11 PM CDT  To: Lisa Dorantes DNP

## 2025-04-01 NOTE — TELEPHONE ENCOUNTER
Spoke with patient's daughter about urine cx results and plan of care. Put in orders to repeat urine cx after completion of antibiotics. Daughter verbally understood and stated they will go to  [facility].

## 2025-04-02 ENCOUNTER — TELEPHONE (OUTPATIENT)
Dept: UROLOGY | Facility: CLINIC | Age: 85
End: 2025-04-02
Payer: MEDICARE

## 2025-04-02 NOTE — TELEPHONE ENCOUNTER
I called and spoke with patient's daughter Vanessa, notified her to have the patient continue to take ampicillin and as it effective for the bacteria growing. She verbalized understanding of everything discussed, pt is planning to repeat culture after abx, order is in the system.

## 2025-04-09 ENCOUNTER — LAB VISIT (OUTPATIENT)
Dept: LAB | Facility: HOSPITAL | Age: 85
End: 2025-04-09
Attending: NURSE PRACTITIONER
Payer: MEDICARE

## 2025-04-09 DIAGNOSIS — Z87.440 HISTORY OF RECURRENT UTIS: ICD-10-CM

## 2025-04-09 DIAGNOSIS — N30.80 CYSTITIS CYSTICA: ICD-10-CM

## 2025-04-09 PROCEDURE — 87088 URINE BACTERIA CULTURE: CPT

## 2025-04-12 ENCOUNTER — HOSPITAL ENCOUNTER (EMERGENCY)
Facility: HOSPITAL | Age: 85
Discharge: HOME | End: 2025-04-12
Attending: SURGERY
Payer: MEDICARE

## 2025-04-12 ENCOUNTER — HOSPITAL ENCOUNTER (EMERGENCY)
Facility: HOSPITAL | Age: 85
Discharge: HOME | End: 2025-04-12
Attending: STUDENT IN AN ORGANIZED HEALTH CARE EDUCATION/TRAINING PROGRAM
Payer: MEDICARE

## 2025-04-12 VITALS
WEIGHT: 178.56 LBS | OXYGEN SATURATION: 97 % | BODY MASS INDEX: 31.63 KG/M2 | HEART RATE: 70 BPM | RESPIRATION RATE: 17 BRPM | SYSTOLIC BLOOD PRESSURE: 189 MMHG | DIASTOLIC BLOOD PRESSURE: 81 MMHG | TEMPERATURE: 98 F

## 2025-04-12 VITALS
TEMPERATURE: 98 F | HEART RATE: 73 BPM | OXYGEN SATURATION: 96 % | BODY MASS INDEX: 31.63 KG/M2 | DIASTOLIC BLOOD PRESSURE: 80 MMHG | RESPIRATION RATE: 18 BRPM | SYSTOLIC BLOOD PRESSURE: 182 MMHG | WEIGHT: 178.56 LBS

## 2025-04-12 DIAGNOSIS — N39.0 RECURRENT URINARY TRACT INFECTION: Primary | ICD-10-CM

## 2025-04-12 DIAGNOSIS — S01.111A EYEBROW LACERATION, RIGHT, INITIAL ENCOUNTER: ICD-10-CM

## 2025-04-12 DIAGNOSIS — S09.90XA INJURY OF HEAD, INITIAL ENCOUNTER: Primary | ICD-10-CM

## 2025-04-12 LAB
ABSOLUTE EOSINOPHIL (OHS): 0.43 K/UL
ABSOLUTE MONOCYTE (OHS): 0.78 K/UL (ref 0.3–1)
ABSOLUTE NEUTROPHIL COUNT (OHS): 4.88 K/UL (ref 1.8–7.7)
ALBUMIN SERPL BCP-MCNC: 4 G/DL (ref 3.5–5.2)
ALP SERPL-CCNC: 107 UNIT/L (ref 40–150)
ALT SERPL W/O P-5'-P-CCNC: 23 UNIT/L (ref 10–44)
ANION GAP (OHS): 12 MMOL/L (ref 8–16)
AST SERPL-CCNC: 22 UNIT/L (ref 11–45)
BACTERIA #/AREA URNS HPF: ABNORMAL /HPF
BASOPHILS # BLD AUTO: 0.08 K/UL
BASOPHILS NFR BLD AUTO: 1 %
BILIRUB SERPL-MCNC: 0.4 MG/DL (ref 0.1–1)
BILIRUB UR QL STRIP.AUTO: NEGATIVE
BUN SERPL-MCNC: 23 MG/DL (ref 8–23)
CALCIUM SERPL-MCNC: 9.4 MG/DL (ref 8.7–10.5)
CHLORIDE SERPL-SCNC: 98 MMOL/L (ref 95–110)
CLARITY UR: CLEAR
CO2 SERPL-SCNC: 23 MMOL/L (ref 23–29)
COLOR UR AUTO: YELLOW
CREAT SERPL-MCNC: 1.1 MG/DL (ref 0.5–1.4)
ERYTHROCYTE [DISTWIDTH] IN BLOOD BY AUTOMATED COUNT: 12.4 % (ref 11.5–14.5)
GFR SERPLBLD CREATININE-BSD FMLA CKD-EPI: 49 ML/MIN/1.73/M2
GLUCOSE SERPL-MCNC: 106 MG/DL (ref 70–110)
GLUCOSE UR QL STRIP: NEGATIVE
HCT VFR BLD AUTO: 37.3 % (ref 37–48.5)
HGB BLD-MCNC: 12.8 GM/DL (ref 12–16)
HGB UR QL STRIP: ABNORMAL
IMM GRANULOCYTES # BLD AUTO: 0.04 K/UL (ref 0–0.04)
IMM GRANULOCYTES NFR BLD AUTO: 0.5 % (ref 0–0.5)
KETONES UR QL STRIP: NEGATIVE
LEUKOCYTE ESTERASE UR QL STRIP: ABNORMAL
LYMPHOCYTES # BLD AUTO: 2 K/UL (ref 1–4.8)
MCH RBC QN AUTO: 28.8 PG (ref 27–31)
MCHC RBC AUTO-ENTMCNC: 34.3 G/DL (ref 32–36)
MCV RBC AUTO: 84 FL (ref 82–98)
MICROSCOPIC COMMENT: ABNORMAL
NITRITE UR QL STRIP: POSITIVE
NUCLEATED RBC (/100WBC) (OHS): 0 /100 WBC
PH UR STRIP: 6 [PH]
PLATELET # BLD AUTO: 294 K/UL (ref 150–450)
PMV BLD AUTO: 10 FL (ref 9.2–12.9)
POTASSIUM SERPL-SCNC: 4 MMOL/L (ref 3.5–5.1)
PROT SERPL-MCNC: 7.2 GM/DL (ref 6–8.4)
PROT UR QL STRIP: NEGATIVE
RBC # BLD AUTO: 4.45 M/UL (ref 4–5.4)
RBC #/AREA URNS HPF: 1 /HPF (ref 0–4)
RELATIVE EOSINOPHIL (OHS): 5.2 %
RELATIVE LYMPHOCYTE (OHS): 24.4 % (ref 18–48)
RELATIVE MONOCYTE (OHS): 9.5 % (ref 4–15)
RELATIVE NEUTROPHIL (OHS): 59.4 % (ref 38–73)
SODIUM SERPL-SCNC: 133 MMOL/L (ref 136–145)
SP GR UR STRIP: <=1.005
UROBILINOGEN UR STRIP-ACNC: NEGATIVE EU/DL
WBC # BLD AUTO: 8.21 K/UL (ref 3.9–12.7)
WBC #/AREA URNS HPF: 50 /HPF (ref 0–5)
WBC CLUMPS URNS QL MICRO: ABNORMAL

## 2025-04-12 PROCEDURE — 99284 EMERGENCY DEPT VISIT MOD MDM: CPT | Mod: 25,27

## 2025-04-12 PROCEDURE — 87086 URINE CULTURE/COLONY COUNT: CPT | Performed by: STUDENT IN AN ORGANIZED HEALTH CARE EDUCATION/TRAINING PROGRAM

## 2025-04-12 PROCEDURE — 96375 TX/PRO/DX INJ NEW DRUG ADDON: CPT

## 2025-04-12 PROCEDURE — 81003 URINALYSIS AUTO W/O SCOPE: CPT | Performed by: STUDENT IN AN ORGANIZED HEALTH CARE EDUCATION/TRAINING PROGRAM

## 2025-04-12 PROCEDURE — 96374 THER/PROPH/DIAG INJ IV PUSH: CPT

## 2025-04-12 PROCEDURE — 63600175 PHARM REV CODE 636 W HCPCS: Performed by: STUDENT IN AN ORGANIZED HEALTH CARE EDUCATION/TRAINING PROGRAM

## 2025-04-12 PROCEDURE — 63600175 PHARM REV CODE 636 W HCPCS: Mod: JZ,TB

## 2025-04-12 PROCEDURE — 80053 COMPREHEN METABOLIC PANEL: CPT

## 2025-04-12 PROCEDURE — 99283 EMERGENCY DEPT VISIT LOW MDM: CPT | Mod: 25

## 2025-04-12 PROCEDURE — 12011 RPR F/E/E/N/L/M 2.5 CM/<: CPT

## 2025-04-12 PROCEDURE — 85025 COMPLETE CBC W/AUTO DIFF WBC: CPT

## 2025-04-12 PROCEDURE — 63600175 PHARM REV CODE 636 W HCPCS

## 2025-04-12 RX ORDER — KETOROLAC TROMETHAMINE 30 MG/ML
15 INJECTION, SOLUTION INTRAMUSCULAR; INTRAVENOUS
Status: COMPLETED | OUTPATIENT
Start: 2025-04-12 | End: 2025-04-12

## 2025-04-12 RX ORDER — LIDOCAINE HYDROCHLORIDE 10 MG/ML
10 INJECTION, SOLUTION INFILTRATION; PERINEURAL
Status: DISCONTINUED | OUTPATIENT
Start: 2025-04-12 | End: 2025-04-12

## 2025-04-12 RX ORDER — LIDOCAINE HYDROCHLORIDE 10 MG/ML
10 INJECTION, SOLUTION INFILTRATION; PERINEURAL
Status: COMPLETED | OUTPATIENT
Start: 2025-04-12 | End: 2025-04-12

## 2025-04-12 RX ORDER — MEROPENEM 500 MG/1
500 INJECTION, POWDER, FOR SOLUTION INTRAVENOUS
Status: DISCONTINUED | OUTPATIENT
Start: 2025-04-12 | End: 2025-04-12

## 2025-04-12 RX ORDER — MEROPENEM 1 G/1
1 INJECTION, POWDER, FOR SOLUTION INTRAVENOUS
Status: COMPLETED | OUTPATIENT
Start: 2025-04-12 | End: 2025-04-12

## 2025-04-12 RX ADMIN — KETOROLAC TROMETHAMINE 15 MG: 30 INJECTION, SOLUTION INTRAMUSCULAR at 06:04

## 2025-04-12 RX ADMIN — LIDOCAINE HYDROCHLORIDE 10 ML: 10 INJECTION, SOLUTION INFILTRATION; PERINEURAL at 07:04

## 2025-04-12 RX ADMIN — MEROPENEM 1 G: 1 INJECTION, POWDER, FOR SOLUTION INTRAVENOUS at 05:04

## 2025-04-12 NOTE — ED PROVIDER NOTES
Encounter Date: 4/12/2025       History     Chief Complaint   Patient presents with    Fall     Pt had slip and fall in ED lobby while attempting to walk to her car, tripped over her daughter's foot. Hit head on floor. Laceration to right forehead noted. Denies dizziness or LOC. Bleeding controlled.     History of Present Illness  Ricky Hassan is a 84 y.o. female with a history of recurrent urinary tract   infections, hyperlipidemia presents to ER today with complaints of mechanical   fall in ED lobby of [facility].  Patient reports she was discharged   from ER visit and  tripped over her daughter's foot falling onto the ground injuring   her right eyebrow causing laceration and minor headache denies LOC denies   vomiting reports headache pain 3/10.    The history is provided by the patient and a caregiver.     Review of patient's allergies indicates:   Allergen Reactions    Cipro [ciprofloxacin hcl] Itching    Vicodin [hydrocodone-acetaminophen] Hives    Adhesive Dermatitis     Long term adhesive sensitivity    Sulfa (sulfonamide antibiotics) Rash     Past Medical History:   Diagnosis Date    Arthritis     Digestive disorder     Hyperlipidemia      Past Surgical History:   Procedure Laterality Date    BLADDER FULGURATION N/A 3/2/2023    Procedure: FULGURATION, BLADDER;  Surgeon: Reed Walton MD;  Location: [facility] OR;  Service: Urology;  Laterality: N/A;    CATARACT EXTRACTION Bilateral     CYSTOSCOPY W/ RETROGRADES Bilateral 3/2/2023    Procedure: CYSTOSCOPY, WITH RETROGRADE PYELOGRAM;  Surgeon: Reed Walton MD;  Location: [facility] OR;  Service: Urology;  Laterality: Bilateral;  cysto with bilateral retrogrades with possible bladder fulguration    HYSTERECTOMY      KNEE ARTHROSCOPY Bilateral      No family history on file.  Social History[1]    Review of Systems   Constitutional: Negative.    HENT: Negative.     Eyes: Negative.    Respiratory: Negative.     Cardiovascular: Negative.     Gastrointestinal: Negative.    Genitourinary:  Negative for dysuria, urgency and vaginal discharge.   Skin:  Positive for wound.   Neurological:  Positive for headaches.   Psychiatric/Behavioral: Negative.     All other systems reviewed and are negative.      Physical Exam     Initial Vitals   BP Pulse Resp Temp SpO2   04/12/25 1858 04/12/25 1857 04/12/25 1858 04/12/25 1858 04/12/25 1857   (!) 197/81 69 17 97.9 °F (36.6 °C) 96 %      MAP       --                Physical Exam    Nursing note and vitals reviewed.  Constitutional: She appears well-developed and well-nourished. She is not diaphoretic. No distress.   HENT:   Head: Normocephalic.   Eyes: Pupils are equal, round, and reactive to light. Right eye exhibits no discharge.   Neck: Neck supple. No thyromegaly present. No tracheal deviation present.   Normal range of motion.  Cardiovascular:  Normal rate and regular rhythm.           Pulmonary/Chest: Breath sounds normal. No stridor. No respiratory distress. She has no wheezes. She has no rhonchi. She has no rales.   Abdominal: Abdomen is soft. Bowel sounds are normal. She exhibits no distension. There is no abdominal tenderness. There is no rebound and no guarding.   Musculoskeletal:         General: No tenderness or edema.      Cervical back: Normal range of motion and neck supple.     Neurological: She is alert and oriented to person, place, and time. GCS score is 15. GCS eye subscore is 4. GCS verbal subscore is 5. GCS motor subscore is 6.   Skin: Capillary refill takes less than 2 seconds. No rash noted. No erythema.   Patient has a laceration to right eyebrow with control bleeding at this time laceration is 2 cm please see photographic documentation   Psychiatric: She has a normal mood and affect. Thought content normal.         ED Course   Lac Repair    Date/Time: 4/12/2025 7:24 PM    Performed by: Boston Hidalgo NP  Authorized by: Boston Hidalgo NP    Consent:     Consent obtained:  Verbal    Consent  given by:  Patient    Risks discussed:  Infection, pain, need for additional repair, poor cosmetic result, retained foreign body, tendon damage, nerve damage, poor wound healing and vascular damage    Alternatives discussed:  No treatment, delayed treatment, observation and referral  Universal protocol:     Procedure explained and questions answered to patient or proxy's satisfaction: yes      Relevant documents present and verified: yes      Test results available: yes      Patient identity confirmed:  Verbally with patient  Anesthesia:     Anesthesia method:  Local infiltration    Local anesthetic:  Lidocaine 1% w/o epi  Laceration details:     Location:  Face    Face location:  R eyebrow    Length (cm):  2  Pre-procedure details:     Preparation:  Patient was prepped and draped in usual sterile fashion  Exploration:     Limited defect created (wound extended): no    Treatment:     Area cleansed with:  Povidone-iodine and saline    Amount of cleaning:  Standard    Debridement:  None    Undermining:  None    Scar revision: no    Skin repair:     Repair method:  Sutures    Suture size:  5-0    Suture material:  Nylon    Suture technique:  Simple interrupted    Number of sutures:  7  Approximation:     Approximation:  Close  Repair type:     Repair type:  Simple  Post-procedure details:     Dressing:  Non-adherent dressing    Procedure completion:  Tolerated well, no immediate complications    Labs Reviewed - No data to display       Imaging Results              CT Head Without Contrast (Final result)  Result time 04/12/25 19:59:11      Final result by Kris Nguyen MD (04/12/25 19:59:11)                   Impression:      No acute intracranial abnormality.      Electronically signed by: Kris Nguyen  Date:    04/12/2025  Time:    19:59               Narrative:    EXAMINATION:  CT HEAD WITHOUT CONTRAST    CLINICAL HISTORY:  Head trauma, minor (Age >= 65y);    TECHNIQUE:  Low dose axial images were  obtained through the head.  Coronal and sagittal reformations were also performed. Contrast was not administered.    COMPARISON:  None.    FINDINGS:  Intracranial compartment:    Ventricles and sulci are normal in size for age without evidence of hydrocephalus. No extra-axial blood or fluid collections.    Mild chronic microvascular ischemia.  No parenchymal mass, hemorrhage, edema or major vascular distribution infarct.    Skull/extracranial contents (limited evaluation): Right supraorbital contusion with laceration.  No fracture. Mastoid air cells and paranasal sinuses are essentially clear.                                       Medications   LIDOcaine HCL 10 mg/ml (1%) injection 10 mL (10 mLs Other Given 4/12/25 1900)     Medical Decision Making  Differential diagnosis include laceration, mechanical fall, head injury    Patient has sustained minor head injury from falling in ED lobby after   tripping over daughter's foot laceration to right eyebrow repair please   see procedure note   CT of head reveals no acute intracranial abnormalities   Home wound care discussed with patient.    Vital signs stable with a CR visit.    Patient instructed to follow-up with PCP in the next 7-10 days   for suture removal from laceration of right eyebrow.    Patient verbalized understanding.  Patient stable at time of discharge in no acute distress. No life-  threatening illnesses were found during ER visit today. Patient was   instructed to follow-up with PCP or other recommended specialist   within the next 48-72 hours.  Patient was instructed to return to ER   immediately for any worsening or concerning symptoms. All discharge   instructions discussed with patient, and patient agrees to comply with   discharge instructions given today.     Problems Addressed:  Eyebrow laceration, right, initial encounter: complicated acute illness or injury  Injury of head, initial encounter: complicated acute illness or injury    Amount and/or  Complexity of Data Reviewed  Radiology: ordered and independent interpretation performed.    ED Management & Risks of Complication, Morbidity, & Mortality:  Patient was seen & evaluated by the nurse practitioner this evening  Was discharged from the hospital & had an accidental fall leaving  Was readmitted to the ER with a (-) head CT after for the fall  Suture closure of right eyebrow wound by the nurse practitioner  Follow-up in the next 7 to 10 days for suture removal  Return with any concerns on discharge tonight    Clinical Impression:  Final diagnoses:  [S09.90XA] Injury of head, initial encounter (Primary)  [S01.111A] Eyebrow laceration, right, initial encounter          ED Disposition Condition    Discharge Stable          ED Prescriptions    None       Follow-up Information       Follow up With Specialties Details Why Contact Info    Corey Valdes MD Internal Medicine In 1 week For wound re-check, For suture removal [address] [place] [address]  [phone]                       [1]   Social History  Tobacco Use    Smoking status: Never    Smokeless tobacco: Never   Substance Use Topics    Alcohol use: Never    Drug use: Never        Boston Degroot MD  04/17/25 0136

## 2025-04-12 NOTE — PROGRESS NOTES
Pharmacist Renal Dose Adjustment Note    Ricky Hassan is a 84 y.o. female being treated with the medication meropenem.    Patient Data:    Vital Signs (Most Recent):  Temp: 97.9 °F (36.6 °C) (04/12/25 1544)  Pulse: 88 (04/12/25 1544)  Resp: 20 (04/12/25 1544)  BP: (!) 191/84 (04/12/25 1544)  SpO2: 96 % (04/12/25 1544) Vital Signs (72h Range):  Temp:  [97.9 °F (36.6 °C)]   Pulse:  [88]   Resp:  [20]   BP: (191)/(84)   SpO2:  [96 %]      Recent Labs   Lab 04/12/25  1559   CREATININE 1.1     Serum creatinine: 1.1 mg/dL 04/12/25 1559  Estimated creatinine clearance: 37.7 mL/min    Meropenem 500 mg IV once will be changed to Meropenem 1000 mg IV once per pharmacy renal dosing protocol for CrCl 25-50 mL/min.    Pharmacist's Name: Siobhan Daniel

## 2025-04-12 NOTE — ED PROVIDER NOTES
Encounter Date: 4/12/2025       History     Chief Complaint   Patient presents with    Urinary Tract Infection     Pt arrives to the ed with c/o dysuria and bladder pressure x 3 days. Pt was recently treated with antibiotics and awaiting culture per daughter.     This note is dictated on M*Modal word recognition program.  There are word recognition mistakes and grammatical errors that are occasionally missed on review.     Ricky Hassan is a 84 y.o. female with a recent history of frequent/recurrent urinary tract infections.  Reports she woke up this morning with a bladder burning and dysuria at 1:00 a.m..  Patient reports she has been on several antibiotics over the last month to treat her urinary tract infections she is scheduled for ultrasound of her bladder at the end of this month.  Patient denies fevers currently.  Reports lower abdominal cramping with dysuria  CT renal stone study was performed on March 18, 2025 by Urology outpatient had the following impression within epic system--No acute abnormality in the abdomen or pelvis.  No evidence of urinary tract calculi or obstruction.    2/18/25--started on a course of Macrobid for 5 days finished antibiotic  2/21/25--started on course of cefdinir for UTI did not finish full course of antibiotic therapy  2/24/25--started on course of Augmentin for 5 days finished antibiotic therapy  2/25/25--given Diflucan 1 dose finished prescription  3/7/25--started on course of Macrobid for 14 days finished antibiotic therapy  3/29/25--started on ampicillin for 7 days finished antibiotic therapy  3/31/25 started on ampicillin for 7 more days finish antibiotic therapy  On 2/25/25 was also placed on trospium for 1 month finish prescription            Review of patient's allergies indicates:   Allergen Reactions    Cipro [ciprofloxacin hcl] Itching    Vicodin [hydrocodone-acetaminophen] Hives    Adhesive Dermatitis     Long term adhesive sensitivity    Sulfa (sulfonamide  antibiotics) Rash     Past Medical History:   Diagnosis Date    Arthritis     Digestive disorder     Hyperlipidemia      Past Surgical History:   Procedure Laterality Date    BLADDER FULGURATION N/A 3/2/2023    Procedure: FULGURATION, BLADDER;  Surgeon: Reed Walton MD;  Location: [facility] OR;  Service: Urology;  Laterality: N/A;    CATARACT EXTRACTION Bilateral     CYSTOSCOPY W/ RETROGRADES Bilateral 3/2/2023    Procedure: CYSTOSCOPY, WITH RETROGRADE PYELOGRAM;  Surgeon: Reed Walton MD;  Location: [facility] OR;  Service: Urology;  Laterality: Bilateral;  cysto with bilateral retrogrades with possible bladder fulguration    HYSTERECTOMY      KNEE ARTHROSCOPY Bilateral      No family history on file.  Social History[1]  Review of Systems   Constitutional:  Negative for diaphoresis and fatigue.   Gastrointestinal:  Positive for abdominal pain.   Genitourinary:  Positive for dysuria, frequency and urgency.   All other systems reviewed and are negative.      Physical Exam     Initial Vitals [04/12/25 1544]   BP Pulse Resp Temp SpO2   (!) 191/84 88 20 97.9 °F (36.6 °C) 96 %      MAP       --         Physical Exam    Nursing note and vitals reviewed.  Constitutional: She appears well-developed and well-nourished.   HENT:   Head: Normocephalic.   Eyes: Pupils are equal, round, and reactive to light.   Neck:   Normal range of motion.  Pulmonary/Chest: Breath sounds normal. No respiratory distress.   Abdominal: Abdomen is soft. She exhibits no distension. There is no abdominal tenderness.   Musculoskeletal:         General: No edema. Normal range of motion.      Cervical back: Normal range of motion.     Neurological: She is alert and oriented to person, place, and time. GCS score is 15. GCS eye subscore is 4. GCS verbal subscore is 5. GCS motor subscore is 6.   Skin: Capillary refill takes less than 2 seconds. No erythema.   Psychiatric: She has a normal mood and affect. Thought content normal.         ED Course    Procedures  Labs Reviewed   URINALYSIS, REFLEX TO URINE CULTURE - Abnormal       Result Value    Color, UA Yellow      Appearance, UA Clear      pH, UA 6.0      Spec Grav UA <=1.005 (*)     Protein, UA Negative      Glucose, UA Negative      Ketones, UA Negative      Bilirubin, UA Negative      Blood, UA Trace (*)     Nitrites, UA Positive (*)     Urobilinogen, UA Negative      Leukocyte Esterase, UA 2+ (*)    COMPREHENSIVE METABOLIC PANEL - Abnormal    Sodium 133 (*)     Potassium 4.0      Chloride 98      CO2 23      Glucose 106      BUN 23      Creatinine 1.1      Calcium 9.4      Protein Total 7.2      Albumin 4.0      Bilirubin Total 0.4            AST 22      ALT 23      Anion Gap 12      eGFR 49 (*)    URINALYSIS MICROSCOPIC - Abnormal    RBC, UA 1      WBC, UA 50 (*)     WBC Clumps, UA Occasional (*)     Bacteria, UA Rare      Microscopic Comment       CBC WITH DIFFERENTIAL - Normal    WBC 8.21      RBC 4.45      HGB 12.8      HCT 37.3      MCV 84      MCH 28.8      MCHC 34.3      RDW 12.4      Platelet Count 294      MPV 10.0      Nucleated RBC 0      Neut % 59.4      Lymph % 24.4      Mono % 9.5      Eos % 5.2      Basophil % 1.0      Imm Grans % 0.5      Neut # 4.88      Lymph # 2.00      Mono # 0.78      Eos # 0.43      Baso # 0.08      Imm Grans # 0.04     CULTURE, URINE   CBC W/ AUTO DIFFERENTIAL    Narrative:     The following orders were created for panel order CBC auto differential.  Procedure                               Abnormality         Status                     ---------                               -----------         ------                     CBC with Differential[[id number]]       Normal              Final result                 Please view results for these tests on the individual orders.   [place] URINE HOLD          Imaging Results    None          Medications   meropenem injection 1 g (1 g Intravenous Given 4/12/25 1750)   ketorolac injection 15 mg (15 mg Intravenous Given  4/12/25 1815)     Medical Decision Making  Differential diagnosis includes recurrent urinary tract infections, bladder outlet obstruction    CBC reveals no leukocytosis no anemia   CMP reveals a sodium 133, GFR 49 otherwise within normal limits   Urinalysis again positive for acute cystitis/recurrent urinary tract infection 50 white blood cells occasional white blood cell clumps, positive nitrites +leukocytes  Will treat patient with IV dose of meropenem  Will follow urine culture until sensitivities are reported from urine culture obtained on 4 /9/25  This urine culture is currently growing Proteus type bacteria.  Unfortunately patient is allergic to both Bactrim and Cipro.  Postvoid residual today reveals 35 mL via bladder scan  Spoke with Dr. Chery on-call hospital medicine instructed to follow urine cultures if urine culture from April 9th grows out bacteria only sensitive to IV medication will call patient back to admit to hospital at this point.  Vital signs stable today no signs of urosepsis  Patient agrees with and verbalizes understanding of plan of care  Patient stable at time of discharge in no acute distress.  No life-threatening illnesses were found during ER visit today.  Patient was instructed to follow-up with PCP or other recommended specialist within the next 48-72 hours.  Patient was instructed to return to ER immediately for any worsening or concerning symptoms.  All discharge instructions discussed with patient, and patient agrees to comply with discharge instructions given today.         Amount and/or Complexity of Data Reviewed  Labs: ordered.    Risk  Prescription drug management.                                          Clinical Impression:  Final diagnoses:  [N39.0] Recurrent urinary tract infection (Primary)          ED Disposition Condition    Discharge Stable          ED Prescriptions    None       Follow-up Information    None              [1]   Social History  Tobacco Use    Smoking  status: Never    Smokeless tobacco: Never   Substance Use Topics    Alcohol use: Never    Drug use: Never        Boston Hidalgo NP  04/12/25 2199

## 2025-04-13 ENCOUNTER — TELEPHONE (OUTPATIENT)
Facility: OTHER | Age: 85
End: 2025-04-13
Payer: MEDICARE

## 2025-04-13 ENCOUNTER — HOSPITAL ENCOUNTER (OUTPATIENT)
Facility: HOSPITAL | Age: 85
Discharge: HOME | End: 2025-04-15
Attending: SURGERY | Admitting: FAMILY MEDICINE
Payer: MEDICARE

## 2025-04-13 DIAGNOSIS — N30.00 ACUTE CYSTITIS WITHOUT HEMATURIA: ICD-10-CM

## 2025-04-13 DIAGNOSIS — R82.90 ABNORMAL URINALYSIS: Primary | ICD-10-CM

## 2025-04-13 DIAGNOSIS — K65.4 IDIOPATHIC SCLEROSING MESENTERITIS: ICD-10-CM

## 2025-04-13 LAB
ABSOLUTE EOSINOPHIL (OHS): 0.39 K/UL
ABSOLUTE MONOCYTE (OHS): 0.62 K/UL (ref 0.3–1)
ABSOLUTE NEUTROPHIL COUNT (OHS): 4.27 K/UL (ref 1.8–7.7)
ALBUMIN SERPL BCP-MCNC: 4 G/DL (ref 3.5–5.2)
ALP SERPL-CCNC: 107 UNIT/L (ref 40–150)
ALT SERPL W/O P-5'-P-CCNC: 25 UNIT/L (ref 10–44)
ANION GAP (OHS): 12 MMOL/L (ref 8–16)
AST SERPL-CCNC: 22 UNIT/L (ref 11–45)
BACTERIA UR CULT: ABNORMAL
BASOPHILS # BLD AUTO: 0.06 K/UL
BASOPHILS NFR BLD AUTO: 0.8 %
BILIRUB SERPL-MCNC: 0.5 MG/DL (ref 0.1–1)
BILIRUB UR QL STRIP.AUTO: NEGATIVE
BUN SERPL-MCNC: 21 MG/DL (ref 8–23)
CALCIUM SERPL-MCNC: 9.3 MG/DL (ref 8.7–10.5)
CHLORIDE SERPL-SCNC: 98 MMOL/L (ref 95–110)
CLARITY UR: CLEAR
CO2 SERPL-SCNC: 23 MMOL/L (ref 23–29)
COLOR UR AUTO: YELLOW
CREAT SERPL-MCNC: 1.4 MG/DL (ref 0.5–1.4)
ERYTHROCYTE [DISTWIDTH] IN BLOOD BY AUTOMATED COUNT: 12.5 % (ref 11.5–14.5)
GFR SERPLBLD CREATININE-BSD FMLA CKD-EPI: 37 ML/MIN/1.73/M2
GLUCOSE SERPL-MCNC: 100 MG/DL (ref 70–110)
GLUCOSE UR QL STRIP: NEGATIVE
HCT VFR BLD AUTO: 37.3 % (ref 37–48.5)
HGB BLD-MCNC: 12.6 GM/DL (ref 12–16)
HGB UR QL STRIP: NEGATIVE
IMM GRANULOCYTES # BLD AUTO: 0.02 K/UL (ref 0–0.04)
IMM GRANULOCYTES NFR BLD AUTO: 0.3 % (ref 0–0.5)
KETONES UR QL STRIP: NEGATIVE
LACTATE SERPL-SCNC: 1.1 MMOL/L (ref 0.5–2.2)
LEUKOCYTE ESTERASE UR QL STRIP: NEGATIVE
LYMPHOCYTES # BLD AUTO: 1.99 K/UL (ref 1–4.8)
MCH RBC QN AUTO: 28.4 PG (ref 27–31)
MCHC RBC AUTO-ENTMCNC: 33.8 G/DL (ref 32–36)
MCV RBC AUTO: 84 FL (ref 82–98)
NITRITE UR QL STRIP: NEGATIVE
NUCLEATED RBC (/100WBC) (OHS): 0 /100 WBC
PH UR STRIP: 6 [PH]
PLATELET # BLD AUTO: 285 K/UL (ref 150–450)
PMV BLD AUTO: 9.9 FL (ref 9.2–12.9)
POTASSIUM SERPL-SCNC: 4.2 MMOL/L (ref 3.5–5.1)
PROT SERPL-MCNC: 7.2 GM/DL (ref 6–8.4)
PROT UR QL STRIP: NEGATIVE
RBC # BLD AUTO: 4.44 M/UL (ref 4–5.4)
RELATIVE EOSINOPHIL (OHS): 5.3 %
RELATIVE LYMPHOCYTE (OHS): 27.1 % (ref 18–48)
RELATIVE MONOCYTE (OHS): 8.4 % (ref 4–15)
RELATIVE NEUTROPHIL (OHS): 58.1 % (ref 38–73)
SODIUM SERPL-SCNC: 133 MMOL/L (ref 136–145)
SP GR UR STRIP: <=1.005
UROBILINOGEN UR STRIP-ACNC: NEGATIVE EU/DL
WBC # BLD AUTO: 7.35 K/UL (ref 3.9–12.7)

## 2025-04-13 PROCEDURE — 81003 URINALYSIS AUTO W/O SCOPE: CPT | Performed by: SURGERY

## 2025-04-13 PROCEDURE — 80053 COMPREHEN METABOLIC PANEL: CPT

## 2025-04-13 PROCEDURE — 99900035 HC TECH TIME PER 15 MIN (STAT)

## 2025-04-13 PROCEDURE — 94760 N-INVAS EAR/PLS OXIMETRY 1: CPT

## 2025-04-13 PROCEDURE — G0378 HOSPITAL OBSERVATION PER HR: HCPCS

## 2025-04-13 PROCEDURE — 99900031 HC PATIENT EDUCATION (STAT)

## 2025-04-13 PROCEDURE — 99222 1ST HOSP IP/OBS MODERATE 55: CPT | Mod: ,,, | Performed by: FAMILY MEDICINE

## 2025-04-13 PROCEDURE — 25000003 PHARM REV CODE 250

## 2025-04-13 PROCEDURE — 85025 COMPLETE CBC W/AUTO DIFF WBC: CPT

## 2025-04-13 PROCEDURE — 63600175 PHARM REV CODE 636 W HCPCS

## 2025-04-13 PROCEDURE — 87040 BLOOD CULTURE FOR BACTERIA: CPT | Performed by: SURGERY

## 2025-04-13 PROCEDURE — 94799 UNLISTED PULMONARY SVC/PX: CPT

## 2025-04-13 PROCEDURE — 83605 ASSAY OF LACTIC ACID: CPT | Performed by: SURGERY

## 2025-04-13 PROCEDURE — 63600175 PHARM REV CODE 636 W HCPCS: Performed by: SURGERY

## 2025-04-13 RX ORDER — PANTOPRAZOLE SODIUM 40 MG/1
40 TABLET, DELAYED RELEASE ORAL DAILY
Status: DISCONTINUED | OUTPATIENT
Start: 2025-04-14 | End: 2025-04-15 | Stop reason: HOSPADM

## 2025-04-13 RX ORDER — AMLODIPINE BESYLATE 2.5 MG/1
2.5 TABLET ORAL DAILY
Status: DISCONTINUED | OUTPATIENT
Start: 2025-04-14 | End: 2025-04-15 | Stop reason: HOSPADM

## 2025-04-13 RX ORDER — ONDANSETRON HYDROCHLORIDE 2 MG/ML
4 INJECTION, SOLUTION INTRAVENOUS EVERY 8 HOURS PRN
Status: DISCONTINUED | OUTPATIENT
Start: 2025-04-13 | End: 2025-04-15 | Stop reason: HOSPADM

## 2025-04-13 RX ORDER — SODIUM CHLORIDE 0.9 % (FLUSH) 0.9 %
10 SYRINGE (ML) INJECTION
Status: DISCONTINUED | OUTPATIENT
Start: 2025-04-13 | End: 2025-04-15 | Stop reason: HOSPADM

## 2025-04-13 RX ORDER — LOSARTAN POTASSIUM 50 MG/1
100 TABLET ORAL DAILY
Status: DISCONTINUED | OUTPATIENT
Start: 2025-04-14 | End: 2025-04-15 | Stop reason: HOSPADM

## 2025-04-13 RX ORDER — CLONIDINE HYDROCHLORIDE 0.1 MG/1
0.1 TABLET ORAL EVERY 8 HOURS PRN
Status: DISCONTINUED | OUTPATIENT
Start: 2025-04-13 | End: 2025-04-15 | Stop reason: HOSPADM

## 2025-04-13 RX ORDER — MEROPENEM 1 G/1
1000 INJECTION, POWDER, FOR SOLUTION INTRAVENOUS
Status: DISCONTINUED | OUTPATIENT
Start: 2025-04-13 | End: 2025-04-15

## 2025-04-13 RX ORDER — HYDROCHLOROTHIAZIDE 12.5 MG/1
12.5 TABLET ORAL DAILY
Status: DISCONTINUED | OUTPATIENT
Start: 2025-04-14 | End: 2025-04-15 | Stop reason: HOSPADM

## 2025-04-13 RX ORDER — HYDRALAZINE HYDROCHLORIDE 20 MG/ML
10 INJECTION INTRAMUSCULAR; INTRAVENOUS
Status: COMPLETED | OUTPATIENT
Start: 2025-04-13 | End: 2025-04-13

## 2025-04-13 RX ORDER — TALC
6 POWDER (GRAM) TOPICAL NIGHTLY PRN
Status: DISCONTINUED | OUTPATIENT
Start: 2025-04-13 | End: 2025-04-15 | Stop reason: HOSPADM

## 2025-04-13 RX ADMIN — HYDRALAZINE HYDROCHLORIDE 10 MG: 20 INJECTION INTRAMUSCULAR; INTRAVENOUS at 05:04

## 2025-04-13 RX ADMIN — MEROPENEM 1000 MG: 1 INJECTION, POWDER, FOR SOLUTION INTRAVENOUS at 03:04

## 2025-04-13 RX ADMIN — Medication 1 CAPSULE: at 03:04

## 2025-04-13 RX ADMIN — SODIUM CHLORIDE 500 ML: 9 INJECTION, SOLUTION INTRAVENOUS at 04:04

## 2025-04-13 NOTE — SUBJECTIVE & OBJECTIVE
Past Medical History:   Diagnosis Date    Arthritis     Digestive disorder     Hyperlipidemia        Past Surgical History:   Procedure Laterality Date    BLADDER FULGURATION N/A 3/2/2023    Procedure: FULGURATION, BLADDER;  Surgeon: Reed Walton MD;  Location: [facility]placed by [facility] OR;  Service: Urology;  Laterality: N/A;    CATARACT EXTRACTION Bilateral     CYSTOSCOPY W/ RETROGRADES Bilateral 3/2/2023    Procedure: CYSTOSCOPY, WITH RETROGRADE PYELOGRAM;  Surgeon: Reed Walton MD;  Location: Replaced by [facility] OR;  Service: Urology;  Laterality: Bilateral;  cysto with bilateral retrogrades with possible bladder fulguration    HYSTERECTOMY      KNEE ARTHROSCOPY Bilateral        Review of patient's allergies indicates:   Allergen Reactions    Cipro [ciprofloxacin hcl] Itching    Vicodin [hydrocodone-acetaminophen] Hives    Adhesive Dermatitis     Long term adhesive sensitivity    Sulfa (sulfonamide antibiotics) Rash       Current Facility-Administered Medications on File Prior to Encounter   Medication    [COMPLETED] LIDOcaine HCL 10 mg/ml (1%) injection 10 mL     Current Outpatient Medications on File Prior to Encounter   Medication Sig    amLODIPine (NORVASC) 2.5 MG tablet Take 1 tablet (2.5 mg total) by mouth once daily.    celecoxib (CELEBREX) 100 MG capsule Take 100 mg by mouth once daily.    fluticasone propionate (FLONASE) 50 mcg/actuation nasal spray by Each Nostril route.    losartan-hydrochlorothiazide 100-12.5 mg (HYZAAR) 100-12.5 mg Tab Take 1 tablet by mouth once daily.    methenamine (HIPREX) 1 gram Tab Take 1 tablet (1 g total) by mouth 2 (two) times daily.    pantoprazole (PROTONIX) 40 MG tablet Take 40 mg by mouth once daily.    estradioL (ESTRACE) 0.01 % (0.1 mg/gram) vaginal cream Place 1 g vaginally twice a week.    trospium (SANCTURA) 20 mg Tab tablet Take 1 tablet (20 mg total) by mouth 2 (two) times daily.     Family History    None       Tobacco Use    Smoking status: Never    Smokeless tobacco: Never   Substance  and Sexual Activity    Alcohol use: Never    Drug use: Never    Sexual activity: Not on file     Review of Systems   Constitutional:  Negative for activity change, chills, fatigue, fever and unexpected weight change.   HENT:  Negative for sore throat and trouble swallowing.    Respiratory:  Negative for cough, chest tightness and shortness of breath.    Cardiovascular:  Negative for chest pain and leg swelling.   Gastrointestinal:  Negative for abdominal pain.   Endocrine: Negative for cold intolerance and heat intolerance.   Genitourinary:  Positive for difficulty urinating and dysuria.   Musculoskeletal:  Positive for arthralgias, back pain and gait problem. Negative for joint swelling.   Skin:  Negative for rash.   Neurological:  Negative for numbness.   Hematological:  Negative for adenopathy.   Psychiatric/Behavioral:  Negative for decreased concentration.      Objective:     Vital Signs (Most Recent):  Temp: 97.8 °F (36.6 °C) (04/13/25 1821)  Pulse: 79 (04/13/25 1834)  Resp: 20 (04/13/25 1821)  BP: (!) 172/73 (04/13/25 1821)  SpO2: 97 % (04/13/25 1821) Vital Signs (24h Range):  Temp:  [97.8 °F (36.6 °C)-98.3 °F (36.8 °C)] 97.8 °F (36.6 °C)  Pulse:  [67-96] 79  Resp:  [20] 20  SpO2:  [93 %-97 %] 97 %  BP: (160-203)/(70-81) 172/73     Weight: 81.1 kg (178 lb 12.7 oz)  Body mass index is 31.67 kg/m².     Physical Exam  Constitutional:       Appearance: Normal appearance.   HENT:      Head:      Comments: Small abrasion and bruising over her right eye from a slip and fall that happened yesterday in the emergency room.  Cardiovascular:      Rate and Rhythm: Normal rate and regular rhythm.      Pulses: Normal pulses.      Heart sounds: No murmur heard.     No friction rub. No gallop.   Pulmonary:      Effort: Pulmonary effort is normal.      Breath sounds: Normal breath sounds.   Abdominal:      General: There is distension.      Tenderness: There is no abdominal tenderness.   Musculoskeletal:      Right lower leg:  "No edema.      Left lower leg: No edema.   Neurological:      General: No focal deficit present.      Mental Status: She is alert and oriented to person, place, and time.      Motor: No weakness.      Gait: Gait abnormal.                Significant Labs: All pertinent labs within the past 24 hours have been reviewed.  CBC:   Recent Labs   Lab 04/12/25  1559 04/13/25  1527   WBC 8.21 7.35   HGB 12.8 12.6   HCT 37.3 37.3    285     CMP:   Recent Labs   Lab 04/12/25  1559 04/13/25  1527   * 133*   K 4.0 4.2   CL 98 98   CO2 23 23   BUN 23 21   CREATININE 1.1 1.4   CALCIUM 9.4 9.3   ALBUMIN 4.0 4.0   BILITOT 0.4 0.5   ALKPHOS 107 107   AST 22 22   ALT 23 25   ANIONGAP 12 12     Urine Culture: No results for input(s): "LABURIN" in the last 48 hours.  Urine Studies:   Recent Labs   Lab 04/12/25  1540 04/13/25  1637   COLORU Yellow Yellow   APPEARANCEUA Clear Clear   PHUR 6.0 6.0   SPECGRAV <=1.005* <=1.005*   PROTEINUA Negative Negative   GLUCUA Negative Negative   BILIRUBINUA Negative Negative   OCCULTUA Trace* Negative   NITRITE Positive* Negative   UROBILINOGEN Negative Negative   LEUKOCYTESUR 2+* Negative   RBCUA 1  --    WBCUA 50*  --    BACTERIA Rare  --      Urine Culture 10,000 - 49,999 cfu/ml Proteus penneri Abnormal         Resulting Agency: [facility]     Susceptibility     Proteus penneri     ZE     Ampicillin >16 µg/ml Resistant     Ampicillin/Sulbactam <=8/4 µg/ml Sensitive     Cefazolin >16 µg/ml Resistant     Cefepime <=2 µg/ml Sensitive     Ceftriaxone >2 µg/ml Resistant     Ciprofloxacin <=0.25 µg/ml Sensitive     Ertapenem <=0.5 µg/ml Sensitive     Gentamicin <=2 µg/ml Sensitive     Levofloxacin <=0.5 µg/ml Sensitive     Meropenem <=1 µg/ml Sensitive     Nitrofurantoin >64 µg/ml Resistant     Piperacillin/Tazobactam <=8 µg/ml Sensitive     Tobramycin <=2 µg/ml Sensitive     Trimeth/Sulfa <=2/38 µg/ml Sensitive                 Significant Imaging: I have reviewed all pertinent imaging " results/findings within the past 24 hours.  I have reviewed and interpreted all pertinent imaging results/findings within the past 24 hours.

## 2025-04-13 NOTE — ED PROVIDER NOTES
Encounter Date: 4/13/2025       History     Chief Complaint   Patient presents with    Urinary Tract Infection     Pt arrives to the ed with c/o urine culture sensitively and to be admitted for iv antibiotics.      History of Present Illness  Ricky Hassan is a 84 y.o. female that presents for a multi resistant UTI  She was seen by the nurse practitioner yesterday with a multi resistant UTI  Ucx came back today with a multi resistant Providencia urinary tract infection  Patient also has allergies to Cipro & Bactrim, needs admission for IV antibiotics  No flank pain, no fever, no nausea, no vomiting, no abdominal pain on evaluation    The history is provided by the patient.     Review of patient's allergies indicates:   Allergen Reactions    Cipro [ciprofloxacin hcl] Itching    Vicodin [hydrocodone-acetaminophen] Hives    Adhesive Dermatitis     Long term adhesive sensitivity    Sulfa (sulfonamide antibiotics) Rash     Past Medical History:   Diagnosis Date    Arthritis     Digestive disorder     Hyperlipidemia      Past Surgical History:   Procedure Laterality Date    BLADDER FULGURATION N/A 3/2/2023    Procedure: FULGURATION, BLADDER;  Surgeon: Reed Walton MD;  Location: [facility] OR;  Service: Urology;  Laterality: N/A;    CATARACT EXTRACTION Bilateral     CYSTOSCOPY W/ RETROGRADES Bilateral 3/2/2023    Procedure: CYSTOSCOPY, WITH RETROGRADE PYELOGRAM;  Surgeon: Reed Walton MD;  Location: [facility] OR;  Service: Urology;  Laterality: Bilateral;  cysto with bilateral retrogrades with possible bladder fulguration    HYSTERECTOMY      KNEE ARTHROSCOPY Bilateral      No family history on file.  Social History[1]  Review of Systems   Constitutional: Negative.    HENT: Negative.     Eyes: Negative.    Respiratory: Negative.     Cardiovascular: Negative.    Gastrointestinal: Negative.    Genitourinary:  Positive for dysuria.   Musculoskeletal: Negative.    Skin: Negative.    Neurological: Negative.     Psychiatric/Behavioral: Negative.         Physical Exam     Initial Vitals [04/13/25 1505]   BP Pulse Resp Temp SpO2   (!) 173/81 96 20 98.3 °F (36.8 °C) 95 %      MAP       --         Physical Exam    Nursing note and vitals reviewed.  Constitutional: She appears well-developed.   HENT:   Head: Normocephalic.   Right Ear: External ear normal.   Left Ear: External ear normal.   Nose: Nose normal. Mouth/Throat: Oropharynx is clear and moist.   (+) healing suture laceration in the right eyebrow area   Eyes: Conjunctivae and EOM are normal. Pupils are equal, round, and reactive to light.   Neck: Neck supple. No JVD present.   Normal range of motion.  Cardiovascular:  Normal rate and regular rhythm.           Pulmonary/Chest: No respiratory distress. She has no wheezes. She has no rhonchi. She has no rales. She exhibits no tenderness.   Abdominal: Abdomen is soft. Bowel sounds are normal. She exhibits no distension. There is no abdominal tenderness. There is no rebound.   Musculoskeletal:         General: Normal range of motion.      Cervical back: Normal range of motion and neck supple.     Neurological: She is alert and oriented to person, place, and time. She has normal strength and normal reflexes.   Skin: Skin is warm and dry.         ED Course   Procedures  Labs Reviewed   COMPREHENSIVE METABOLIC PANEL - Abnormal       Result Value    Sodium 133 (*)     Potassium 4.2      Chloride 98      CO2 23      Glucose 100      BUN 21      Creatinine 1.4      Calcium 9.3      Protein Total 7.2      Albumin 4.0      Bilirubin Total 0.5            AST 22      ALT 25      Anion Gap 12      eGFR 37 (*)    CBC WITH DIFFERENTIAL - Normal    WBC 7.35      RBC 4.44      HGB 12.6      HCT 37.3      MCV 84      MCH 28.4      MCHC 33.8      RDW 12.5      Platelet Count 285      MPV 9.9      Nucleated RBC 0      Neut % 58.1      Lymph % 27.1      Mono % 8.4      Eos % 5.3      Basophil % 0.8      Imm Grans % 0.3      Neut #  4.27      Lymph # 1.99      Mono # 0.62      Eos # 0.39      Baso # 0.06      Imm Grans # 0.02     LACTIC ACID, PLASMA - Normal    Lactic Acid Level 1.1      Narrative:     Falsely low lactic acid results can be found in samples containing >=13.0 mg/dL total bilirubin and/or >=3.5 mg/dL direct bilirubin.    CULTURE, BLOOD   CBC W/ AUTO DIFFERENTIAL    Narrative:     The following orders were created for panel order CBC auto differential.  Procedure                               Abnormality         Status                     ---------                               -----------         ------                     CBC with Differential[[id number]]       Normal              Final result                 Please view results for these tests on the individual orders.   URINALYSIS, REFLEX TO URINE CULTURE          Imaging Results              CT Renal Stone Study ABD Pelvis WO (In process)  Result time 04/13/25 16:32:03                     Medications   meropenem injection 1,000 mg (1,000 mg Intravenous Given 4/13/25 1525)   Lactobacillus rhamnosus GG capsule 1 capsule (1 capsule Oral Given 4/13/25 1524)   sodium chloride 0.9% bolus 500 mL 500 mL (500 mLs Intravenous New Bag 4/13/25 1619)     Medical Decision Making  The only diagnosis in the differential is multi resistant urinary tract infection    Problems Addressed:  Acute cystitis without hematuria: complicated acute illness or injury    Amount and/or Complexity of Data Reviewed  Labs: ordered. Decision-making details documented in ED Course.  Radiology: ordered and independent interpretation performed.    ED Management & Risks of Complication, Morbidity, & Mortality:  Blood work within normal limits  Review of urine analysis yesterday, urinary tract infection  Review of urine culture earlier this week with multi resistant UTI  IV Merrem given in the emergency room for the UTI  Discussed with Dr. Chery, will admit for IV antibiotics    Clinical Impression:  Final  diagnoses:  [N30.00] Acute cystitis without hematuria (Primary)          ED Disposition Condition    Observation Stable                  [1]   Social History  Tobacco Use    Smoking status: Never    Smokeless tobacco: Never   Substance Use Topics    Alcohol use: Never    Drug use: Never        Boston Degroot MD  04/13/25 [id number]

## 2025-04-13 NOTE — HPI
Patient is a 84-year-old white female with a history of frequent urinary tract infections and has been treated multiple times in the clinics with oral antibiotics.  Unfortunately she is allergic to fluoroquinolones and sulfa.  Her last urine culture grew Proteus resistant to ampicillin, cefazolin, ceftriaxone, Macrodantin.  It is only sensitive to IV medications.  She was brought back in for further treatment.  Yesterday she had positive nitrites, 50 white blood cells in her urine.  She received 1 dose of Mirapex him in the emergency room yesterday.  Today the urine culture results posted.  Patient is being admitted for IV Mirapex him.  They would like a urology consult.    Urine Culture 10,000 - 49,999 cfu/ml Proteus penneri Abnormal         Resulting Agency: [facility]     Susceptibility     Proteus penneri     ZE     Ampicillin >16 µg/ml Resistant     Ampicillin/Sulbactam <=8/4 µg/ml Sensitive     Cefazolin >16 µg/ml Resistant     Cefepime <=2 µg/ml Sensitive     Ceftriaxone >2 µg/ml Resistant     Ciprofloxacin <=0.25 µg/ml Sensitive     Ertapenem <=0.5 µg/ml Sensitive     Gentamicin <=2 µg/ml Sensitive     Levofloxacin <=0.5 µg/ml Sensitive     Meropenem <=1 µg/ml Sensitive     Nitrofurantoin >64 µg/ml Resistant     Piperacillin/Tazobactam <=8 µg/ml Sensitive     Tobramycin <=2 µg/ml Sensitive     Trimeth/Sulfa <=2/38 µg/ml Sensitive

## 2025-04-13 NOTE — PROGRESS NOTES
Pharmacist Renal Dose Adjustment Note    Ricky Hassan is a 84 y.o. female being treated with the medication MEROPENEM    Patient Data:    Vital Signs (Most Recent):  Temp: 98.3 °F (36.8 °C) (04/13/25 1505)  Pulse: 96 (04/13/25 1505)  Resp: 20 (04/13/25 1505)  BP: (!) 173/81 (04/13/25 1505)  SpO2: 95 % (04/13/25 1505) Vital Signs (72h Range):  Temp:  [97.9 °F (36.6 °C)-98.3 °F (36.8 °C)]   Pulse:  [69-96]   Resp:  [17-20]   BP: (173-197)/(80-84)   SpO2:  [95 %-97 %]      Recent Labs   Lab 04/12/25  1559   CREATININE 1.1     Serum creatinine: 1.1 mg/dL 04/12/25 1559  Estimated creatinine clearance: 37.7 mL/min    Medication: MEROPENEM dose:  500MG  frequency  Q8H  will be changed to medication: MEROPENEM  dose: 1G  frequency: Q12H    Pharmacist's Name: Cely Guzman, PharmD   Pharmacist's Extension: [phone]

## 2025-04-14 PROBLEM — K65.4: Status: ACTIVE | Noted: 2025-04-14

## 2025-04-14 LAB
BACTERIA UR CULT: NORMAL
CRP SERPL-MCNC: 2.3 MG/L
ERYTHROCYTE [SEDIMENTATION RATE] IN BLOOD BY PHOTOMETRIC METHOD: 34 MM/HR

## 2025-04-14 PROCEDURE — 51798 US URINE CAPACITY MEASURE: CPT

## 2025-04-14 PROCEDURE — 51700 IRRIGATION OF BLADDER: CPT

## 2025-04-14 PROCEDURE — 36415 COLL VENOUS BLD VENIPUNCTURE: CPT | Performed by: PHYSICIAN ASSISTANT

## 2025-04-14 PROCEDURE — 85652 RBC SED RATE AUTOMATED: CPT | Performed by: PHYSICIAN ASSISTANT

## 2025-04-14 PROCEDURE — 25000003 PHARM REV CODE 250: Performed by: SURGERY

## 2025-04-14 PROCEDURE — 94760 N-INVAS EAR/PLS OXIMETRY 1: CPT

## 2025-04-14 PROCEDURE — 63600175 PHARM REV CODE 636 W HCPCS: Performed by: SURGERY

## 2025-04-14 PROCEDURE — G0378 HOSPITAL OBSERVATION PER HR: HCPCS

## 2025-04-14 PROCEDURE — 25000003 PHARM REV CODE 250: Performed by: FAMILY MEDICINE

## 2025-04-14 PROCEDURE — 94799 UNLISTED PULMONARY SVC/PX: CPT

## 2025-04-14 PROCEDURE — 86140 C-REACTIVE PROTEIN: CPT | Performed by: PHYSICIAN ASSISTANT

## 2025-04-14 RX ORDER — DOCUSATE SODIUM 100 MG/1
100 CAPSULE, LIQUID FILLED ORAL DAILY
Status: DISCONTINUED | OUTPATIENT
Start: 2025-04-14 | End: 2025-04-15 | Stop reason: HOSPADM

## 2025-04-14 RX ORDER — POLYETHYLENE GLYCOL 3350 17 G/17G
17 POWDER, FOR SOLUTION ORAL DAILY
Status: DISCONTINUED | OUTPATIENT
Start: 2025-04-14 | End: 2025-04-15 | Stop reason: HOSPADM

## 2025-04-14 RX ADMIN — MEROPENEM 1000 MG: 1 INJECTION, POWDER, FOR SOLUTION INTRAVENOUS at 03:04

## 2025-04-14 RX ADMIN — LOSARTAN POTASSIUM 100 MG: 50 TABLET, FILM COATED ORAL at 08:04

## 2025-04-14 RX ADMIN — Medication 1 CAPSULE: at 08:04

## 2025-04-14 RX ADMIN — PANTOPRAZOLE SODIUM 40 MG: 40 TABLET, DELAYED RELEASE ORAL at 08:04

## 2025-04-14 RX ADMIN — AMLODIPINE BESYLATE 2.5 MG: 2.5 TABLET ORAL at 08:04

## 2025-04-14 RX ADMIN — HYDROCHLOROTHIAZIDE 12.5 MG: 12.5 TABLET ORAL at 08:04

## 2025-04-14 NOTE — PLAN OF CARE
"   04/14/25 0929   Rounds   Attendance Provider;Nurse    Discharge Plan A Home   Why the patient remains in the hospital Requires continued medical care   Transition of Care Barriers Other (see comments)  (case management assessing PACN's)     Care team at bedside, discussed plan of care with patient / family.  Discharge planning initiated. Patient provided with Case Management contact information and encouraged to call with concerns or questions. Discharge Planning Begins on Admission Pamphlet provided.  "Connecting with you [facility] Healthcare team" pamphlet provided. Will continue to follow for duration of stay.   "

## 2025-04-14 NOTE — HOSPITAL COURSE
PT HD stable on room air.  Admitted for abdominal pain and drug resistant UTI.  Repeat culture wnl.  However, mesenteritis seen on CTAP.  Pt has generalized abdominal tightening.  Will discuss IV steroids with attending.  Urology recommendations pending.        4/15/25 PT HD stable on room air.  Cleared for discharge by urology.  Urine culture wnl.  F/u outpatient urology and gastroenterology.

## 2025-04-14 NOTE — ASSESSMENT & PLAN NOTE
Patient's blood pressure range in the last 24 hours was: BP  Min: 160/70  Max: 203/78.The patient's inpatient anti-hypertensive regimen is listed below:  Current Antihypertensives  amLODIPine tablet 2.5 mg, Daily, Oral  cloNIDine tablet 0.1 mg, Every 8 hours PRN, Oral  losartan tablet 100 mg, Daily, Oral  hydroCHLOROthiazide tablet 12.5 mg, Daily, Oral    Plan  - BP is controlled, no changes needed to their regimen

## 2025-04-14 NOTE — H&P
[facility] ([address] [place] [address] Medicine  History & Physical    Patient Name: Ricky Hassan  MRN: [id number]  Patient Class: OP- Observation  Admission Date: 4/13/2025  Attending Physician: Vik Bland MD   Primary Care Provider: Corey Valdes MD         Patient information was obtained from patient, past medical records, ER records, and primary team.     Subjective:     Principal Problem:Acute cystitis without hematuria    Chief Complaint:   Chief Complaint   Patient presents with    Urinary Tract Infection     Pt arrives to the ed with c/o urine culture sensitively and to be admitted for iv antibiotics.         HPI: Patient is a 84-year-old white female with a history of frequent urinary tract infections and has been treated multiple times in the clinics with oral antibiotics.  Unfortunately she is allergic to fluoroquinolones and sulfa.  Her last urine culture grew Proteus resistant to ampicillin, cefazolin, ceftriaxone, Macrodantin.  It is only sensitive to IV medications.  She was brought back in for further treatment.  Yesterday she had positive nitrites, 50 white blood cells in her urine.  She received 1 dose of Mirapex him in the emergency room yesterday.  Today the urine culture results posted.  Patient is being admitted for IV Mirapex him.  They would like a urology consult.    Urine Culture 10,000 - 49,999 cfu/ml Proteus penneri Abnormal         Resulting Agency: [facility]     Susceptibility     Proteus penneri     ZE     Ampicillin >16 µg/ml Resistant     Ampicillin/Sulbactam <=8/4 µg/ml Sensitive     Cefazolin >16 µg/ml Resistant     Cefepime <=2 µg/ml Sensitive     Ceftriaxone >2 µg/ml Resistant     Ciprofloxacin <=0.25 µg/ml Sensitive     Ertapenem <=0.5 µg/ml Sensitive     Gentamicin <=2 µg/ml Sensitive     Levofloxacin <=0.5 µg/ml Sensitive     Meropenem <=1 µg/ml Sensitive     Nitrofurantoin >64 µg/ml Resistant     Piperacillin/Tazobactam <=8 µg/ml Sensitive     Tobramycin  <=2 µg/ml Sensitive     Trimeth/Sulfa <=2/38 µg/ml Sensitive                   Past Medical History:   Diagnosis Date    Arthritis     Digestive disorder     Hyperlipidemia        Past Surgical History:   Procedure Laterality Date    BLADDER FULGURATION N/A 3/2/2023    Procedure: FULGURATION, BLADDER;  Surgeon: Reed Walton MD;  Location: [facility] OR;  Service: Urology;  Laterality: N/A;    CATARACT EXTRACTION Bilateral     CYSTOSCOPY W/ RETROGRADES Bilateral 3/2/2023    Procedure: CYSTOSCOPY, WITH RETROGRADE PYELOGRAM;  Surgeon: Reed Walton MD;  Location: [facility] OR;  Service: Urology;  Laterality: Bilateral;  cysto with bilateral retrogrades with possible bladder fulguration    HYSTERECTOMY      KNEE ARTHROSCOPY Bilateral        Review of patient's allergies indicates:   Allergen Reactions    Cipro [ciprofloxacin hcl] Itching    Vicodin [hydrocodone-acetaminophen] Hives    Adhesive Dermatitis     Long term adhesive sensitivity    Sulfa (sulfonamide antibiotics) Rash       Current Facility-Administered Medications on File Prior to Encounter   Medication    [COMPLETED] LIDOcaine HCL 10 mg/ml (1%) injection 10 mL     Current Outpatient Medications on File Prior to Encounter   Medication Sig    amLODIPine (NORVASC) 2.5 MG tablet Take 1 tablet (2.5 mg total) by mouth once daily.    celecoxib (CELEBREX) 100 MG capsule Take 100 mg by mouth once daily.    fluticasone propionate (FLONASE) 50 mcg/actuation nasal spray by Each Nostril route.    losartan-hydrochlorothiazide 100-12.5 mg (HYZAAR) 100-12.5 mg Tab Take 1 tablet by mouth once daily.    methenamine (HIPREX) 1 gram Tab Take 1 tablet (1 g total) by mouth 2 (two) times daily.    pantoprazole (PROTONIX) 40 MG tablet Take 40 mg by mouth once daily.    estradioL (ESTRACE) 0.01 % (0.1 mg/gram) vaginal cream Place 1 g vaginally twice a week.    trospium (SANCTURA) 20 mg Tab tablet Take 1 tablet (20 mg total) by mouth 2 (two) times daily.     Family History    None        Tobacco Use    Smoking status: Never    Smokeless tobacco: Never   Substance and Sexual Activity    Alcohol use: Never    Drug use: Never    Sexual activity: Not on file     Review of Systems   Constitutional:  Negative for activity change, chills, fatigue, fever and unexpected weight change.   HENT:  Negative for sore throat and trouble swallowing.    Respiratory:  Negative for cough, chest tightness and shortness of breath.    Cardiovascular:  Negative for chest pain and leg swelling.   Gastrointestinal:  Negative for abdominal pain.   Endocrine: Negative for cold intolerance and heat intolerance.   Genitourinary:  Positive for difficulty urinating and dysuria.   Musculoskeletal:  Positive for arthralgias, back pain and gait problem. Negative for joint swelling.   Skin:  Negative for rash.   Neurological:  Negative for numbness.   Hematological:  Negative for adenopathy.   Psychiatric/Behavioral:  Negative for decreased concentration.      Objective:     Vital Signs (Most Recent):  Temp: 97.8 °F (36.6 °C) (04/13/25 1821)  Pulse: 79 (04/13/25 1834)  Resp: 20 (04/13/25 1821)  BP: (!) 172/73 (04/13/25 1821)  SpO2: 97 % (04/13/25 1821) Vital Signs (24h Range):  Temp:  [97.8 °F (36.6 °C)-98.3 °F (36.8 °C)] 97.8 °F (36.6 °C)  Pulse:  [67-96] 79  Resp:  [20] 20  SpO2:  [93 %-97 %] 97 %  BP: (160-203)/(70-81) 172/73     Weight: 81.1 kg (178 lb 12.7 oz)  Body mass index is 31.67 kg/m².     Physical Exam  Constitutional:       Appearance: Normal appearance.   HENT:      Head:      Comments: Small abrasion and bruising over her right eye from a slip and fall that happened yesterday in the emergency room.  Cardiovascular:      Rate and Rhythm: Normal rate and regular rhythm.      Pulses: Normal pulses.      Heart sounds: No murmur heard.     No friction rub. No gallop.   Pulmonary:      Effort: Pulmonary effort is normal.      Breath sounds: Normal breath sounds.   Abdominal:      General: There is distension.       "Tenderness: There is no abdominal tenderness.   Musculoskeletal:      Right lower leg: No edema.      Left lower leg: No edema.   Neurological:      General: No focal deficit present.      Mental Status: She is alert and oriented to person, place, and time.      Motor: No weakness.      Gait: Gait abnormal.                Significant Labs: All pertinent labs within the past 24 hours have been reviewed.  CBC:   Recent Labs   Lab 04/12/25  1559 04/13/25  1527   WBC 8.21 7.35   HGB 12.8 12.6   HCT 37.3 37.3    285     CMP:   Recent Labs   Lab 04/12/25  1559 04/13/25  1527   * 133*   K 4.0 4.2   CL 98 98   CO2 23 23   BUN 23 21   CREATININE 1.1 1.4   CALCIUM 9.4 9.3   ALBUMIN 4.0 4.0   BILITOT 0.4 0.5   ALKPHOS 107 107   AST 22 22   ALT 23 25   ANIONGAP 12 12     Urine Culture: No results for input(s): "LABURIN" in the last 48 hours.  Urine Studies:   Recent Labs   Lab 04/12/25  1540 04/13/25  1637   COLORU Yellow Yellow   APPEARANCEUA Clear Clear   PHUR 6.0 6.0   SPECGRAV <=1.005* <=1.005*   PROTEINUA Negative Negative   GLUCUA Negative Negative   BILIRUBINUA Negative Negative   OCCULTUA Trace* Negative   NITRITE Positive* Negative   UROBILINOGEN Negative Negative   LEUKOCYTESUR 2+* Negative   RBCUA 1  --    WBCUA 50*  --    BACTERIA Rare  --      Urine Culture 10,000 - 49,999 cfu/ml Proteus penneri Abnormal         Resulting Agency: [facility]     Susceptibility     Proteus penneri     ZE     Ampicillin >16 µg/ml Resistant     Ampicillin/Sulbactam <=8/4 µg/ml Sensitive     Cefazolin >16 µg/ml Resistant     Cefepime <=2 µg/ml Sensitive     Ceftriaxone >2 µg/ml Resistant     Ciprofloxacin <=0.25 µg/ml Sensitive     Ertapenem <=0.5 µg/ml Sensitive     Gentamicin <=2 µg/ml Sensitive     Levofloxacin <=0.5 µg/ml Sensitive     Meropenem <=1 µg/ml Sensitive     Nitrofurantoin >64 µg/ml Resistant     Piperacillin/Tazobactam <=8 µg/ml Sensitive     Tobramycin <=2 µg/ml Sensitive     Trimeth/Sulfa <=2/38 µg/ml Sensitive "                 Significant Imaging: I have reviewed all pertinent imaging results/findings within the past 24 hours.  I have reviewed and interpreted all pertinent imaging results/findings within the past 24 hours.  Assessment/Plan:     Assessment & Plan  Acute cystitis without hematuria  Continue Mirapex him   Consult Urology    Primary hypertension  Patient's blood pressure range in the last 24 hours was: BP  Min: 160/70  Max: 203/78.The patient's inpatient anti-hypertensive regimen is listed below:  Current Antihypertensives  amLODIPine tablet 2.5 mg, Daily, Oral  cloNIDine tablet 0.1 mg, Every 8 hours PRN, Oral  losartan tablet 100 mg, Daily, Oral  hydroCHLOROthiazide tablet 12.5 mg, Daily, Oral    Plan  - BP is controlled, no changes needed to their regimen    History of recurrent UTIs  Consult Urology    VTE Risk Mitigation (From admission, onward)           Ordered     IP VTE HIGH RISK PATIENT  Once         04/13/25 1826     Place sequential compression device  Until discontinued         04/13/25 1826                       On 04/13/2025, patient should be placed in hospital observation services under my care.        Pharmacist Renal Dose Adjustment Note    Ricky Hassan is a 84 y.o. female being treated with the medication MEROPENEM    Patient Data:    Vital Signs (Most Recent):  Temp: 98.3 °F (36.8 °C) (04/13/25 1505)  Pulse: 96 (04/13/25 1505)  Resp: 20 (04/13/25 1505)  BP: (!) 173/81 (04/13/25 1505)  SpO2: 95 % (04/13/25 1505) Vital Signs (72h Range):  Temp:  [97.9 °F (36.6 °C)-98.3 °F (36.8 °C)]   Pulse:  [69-96]   Resp:  [17-20]   BP: (173-197)/(80-84)   SpO2:  [95 %-97 %]      Recent Labs   Lab 04/12/25  1559   CREATININE 1.1     Serum creatinine: 1.1 mg/dL 04/12/25 1559  Estimated creatinine clearance: 37.7 mL/min    Medication: MEROPENEM dose:  500MG  frequency  Q8H  will be changed to medication: MEROPENEM  dose: 1G  frequency: Q12H    Pharmacist's Name: Cely Guzman, PharmD   Pharmacist's  Extension: [phone]      Vik Bland MD  Department of Hospital Medicine  [place] [facility] Surg (3rd Fl)

## 2025-04-14 NOTE — PROGRESS NOTES
[facility] ([facility])  [facility] Medicine  Progress Note    Patient Name: Ricky Hassan  MRN: [id number]  Patient Class: OP- Observation   Admission Date: 4/13/2025  Length of Stay: 0 days  Attending Physician: Vik Bland MD  Primary Care Provider: Corey Valdes MD        Subjective     Principal Problem:Acute cystitis without hematuria        HPI:  Patient is a 84-year-old white female with a history of frequent urinary tract infections and has been treated multiple times in the clinics with oral antibiotics.  Unfortunately she is allergic to fluoroquinolones and sulfa.  Her last urine culture grew Proteus resistant to ampicillin, cefazolin, ceftriaxone, Macrodantin.  It is only sensitive to IV medications.  She was brought back in for further treatment.  Yesterday she had positive nitrites, 50 white blood cells in her urine.  She received 1 dose of Mirapex him in the emergency room yesterday.  Today the urine culture results posted.  Patient is being admitted for IV Mirapex him.  They would like a urology consult.    Urine Culture 10,000 - 49,999 cfu/ml Proteus penneri Abnormal         Resulting Agency: [facility]     Susceptibility     Proteus penneri     ZE     Ampicillin >16 µg/ml Resistant     Ampicillin/Sulbactam <=8/4 µg/ml Sensitive     Cefazolin >16 µg/ml Resistant     Cefepime <=2 µg/ml Sensitive     Ceftriaxone >2 µg/ml Resistant     Ciprofloxacin <=0.25 µg/ml Sensitive     Ertapenem <=0.5 µg/ml Sensitive     Gentamicin <=2 µg/ml Sensitive     Levofloxacin <=0.5 µg/ml Sensitive     Meropenem <=1 µg/ml Sensitive     Nitrofurantoin >64 µg/ml Resistant     Piperacillin/Tazobactam <=8 µg/ml Sensitive     Tobramycin <=2 µg/ml Sensitive     Trimeth/Sulfa <=2/38 µg/ml Sensitive                   Overview/Hospital Course:  PT HD stable on room air.  Admitted for abdominal pain and drug resistant UTI.  Repeat culture wnl.  However, mesenteritis seen on CTAP.  Pt has generalized abdominal  tightening.  Will discuss IV steroids with attending.  Urology recommendations pending.      Past Medical History:   Diagnosis Date    Arthritis     Digestive disorder     Hyperlipidemia        Past Surgical History:   Procedure Laterality Date    BLADDER FULGURATION N/A 3/2/2023    Procedure: FULGURATION, BLADDER;  Surgeon: Reed Walton MD;  Location: [facility] OR;  Service: Urology;  Laterality: N/A;    CATARACT EXTRACTION Bilateral     CYSTOSCOPY W/ RETROGRADES Bilateral 3/2/2023    Procedure: CYSTOSCOPY, WITH RETROGRADE PYELOGRAM;  Surgeon: Reed Walton MD;  Location: [facility] OR;  Service: Urology;  Laterality: Bilateral;  cysto with bilateral retrogrades with possible bladder fulguration    HYSTERECTOMY      KNEE ARTHROSCOPY Bilateral        Review of patient's allergies indicates:   Allergen Reactions    Cipro [ciprofloxacin hcl] Itching    Vicodin [hydrocodone-acetaminophen] Hives    Adhesive Dermatitis     Long term adhesive sensitivity    Sulfa (sulfonamide antibiotics) Rash       No current facility-administered medications on file prior to encounter.     Current Outpatient Medications on File Prior to Encounter   Medication Sig    amLODIPine (NORVASC) 2.5 MG tablet Take 1 tablet (2.5 mg total) by mouth once daily.    celecoxib (CELEBREX) 100 MG capsule Take 100 mg by mouth once daily.    fluticasone propionate (FLONASE) 50 mcg/actuation nasal spray by Each Nostril route.    losartan-hydrochlorothiazide 100-12.5 mg (HYZAAR) 100-12.5 mg Tab Take 1 tablet by mouth once daily.    methenamine (HIPREX) 1 gram Tab Take 1 tablet (1 g total) by mouth 2 (two) times daily.    pantoprazole (PROTONIX) 40 MG tablet Take 40 mg by mouth once daily.    estradioL (ESTRACE) 0.01 % (0.1 mg/gram) vaginal cream Place 1 g vaginally twice a week.    trospium (SANCTURA) 20 mg Tab tablet Take 1 tablet (20 mg total) by mouth 2 (two) times daily.     Family History    None       Tobacco Use    Smoking status: Never    Smokeless  tobacco: Never   Substance and Sexual Activity    Alcohol use: Never    Drug use: Never    Sexual activity: Not on file     Review of Systems   Constitutional:  Negative for activity change, chills, fatigue, fever and unexpected weight change.   HENT:  Negative for sore throat and trouble swallowing.    Respiratory:  Negative for cough, chest tightness and shortness of breath.    Cardiovascular:  Negative for chest pain and leg swelling.   Gastrointestinal:  Positive for abdominal pain.   Endocrine: Negative for cold intolerance and heat intolerance.   Genitourinary:  Positive for difficulty urinating and dysuria.   Musculoskeletal:  Positive for arthralgias, back pain and gait problem. Negative for joint swelling.   Skin:  Negative for rash.   Neurological:  Negative for numbness.   Hematological:  Negative for adenopathy.   Psychiatric/Behavioral:  Negative for decreased concentration.      Objective:     Vital Signs (Most Recent):  Temp: 98.1 °F (36.7 °C) (04/14/25 0704)  Pulse: 65 (04/14/25 0800)  Resp: 20 (04/14/25 0704)  BP: 139/62 (04/14/25 0704)  SpO2: 95 % (04/14/25 0732) Vital Signs (24h Range):  Temp:  [97.3 °F (36.3 °C)-98.3 °F (36.8 °C)] 98.1 °F (36.7 °C)  Pulse:  [59-96] 65  Resp:  [18-20] 20  SpO2:  [93 %-97 %] 95 %  BP: (135-203)/(60-81) 139/62     Weight: 81.1 kg (178 lb 12.7 oz)  Body mass index is 31.67 kg/m².     Physical Exam  Constitutional:       Appearance: Normal appearance.   HENT:      Head:      Comments: Small abrasion and bruising over her right eye from a slip and fall that happened yesterday in the emergency room.  Cardiovascular:      Rate and Rhythm: Normal rate and regular rhythm.      Pulses: Normal pulses.      Heart sounds: No murmur heard.     No friction rub. No gallop.   Pulmonary:      Effort: Pulmonary effort is normal.      Breath sounds: Normal breath sounds.   Abdominal:      General: There is distension.      Tenderness: There is no abdominal tenderness.    Musculoskeletal:      Right lower leg: No edema.      Left lower leg: No edema.   Neurological:      General: No focal deficit present.      Mental Status: She is alert and oriented to person, place, and time.      Motor: No weakness.                Significant Labs: All pertinent labs within the past 24 hours have been reviewed.  CBC:   Recent Labs   Lab 04/12/25  1559 04/13/25  1527   WBC 8.21 7.35   HGB 12.8 12.6   HCT 37.3 37.3    285     CMP:   Recent Labs   Lab 04/12/25  1559 04/13/25  1527   * 133*   K 4.0 4.2   CL 98 98   CO2 23 23   BUN 23 21   CREATININE 1.1 1.4   CALCIUM 9.4 9.3   ALBUMIN 4.0 4.0   BILITOT 0.4 0.5   ALKPHOS 107 107   AST 22 22   ALT 23 25   ANIONGAP 12 12     Urine Culture:   Recent Labs   Lab 04/12/25  1540   LABURIN Multiple organisms isolated. None in predominance. Repeat if clinically necessary.     Urine Studies:   Recent Labs   Lab 04/12/25  1540 04/13/25  1637   COLORU Yellow Yellow   APPEARANCEUA Clear Clear   PHUR 6.0 6.0   SPECGRAV <=1.005* <=1.005*   PROTEINUA Negative Negative   GLUCUA Negative Negative   BILIRUBINUA Negative Negative   OCCULTUA Trace* Negative   NITRITE Positive* Negative   UROBILINOGEN Negative Negative   LEUKOCYTESUR 2+* Negative   RBCUA 1  --    WBCUA 50*  --    BACTERIA Rare  --      Urine Culture 10,000 - 49,999 cfu/ml Proteus penneri Abnormal         Resulting Agency: [facility]     Susceptibility     Proteus penneri     ZE     Ampicillin >16 µg/ml Resistant     Ampicillin/Sulbactam <=8/4 µg/ml Sensitive     Cefazolin >16 µg/ml Resistant     Cefepime <=2 µg/ml Sensitive     Ceftriaxone >2 µg/ml Resistant     Ciprofloxacin <=0.25 µg/ml Sensitive     Ertapenem <=0.5 µg/ml Sensitive     Gentamicin <=2 µg/ml Sensitive     Levofloxacin <=0.5 µg/ml Sensitive     Meropenem <=1 µg/ml Sensitive     Nitrofurantoin >64 µg/ml Resistant     Piperacillin/Tazobactam <=8 µg/ml Sensitive     Tobramycin <=2 µg/ml Sensitive     Trimeth/Sulfa <=2/38 µg/ml  Sensitive                 Significant Imaging: I have reviewed all pertinent imaging results/findings within the past 24 hours.  I have reviewed and interpreted all pertinent imaging results/findings within the past 24 hours.      Assessment & Plan  Acute cystitis without hematuria  Continue Mirapex him   Consult Urology    4/14 Urine culture wnl.  Urology recommendations pending.  Bladder scan pending.      Primary hypertension  Patient's blood pressure range in the last 24 hours was: BP  Min: 135/60  Max: 203/78.The patient's inpatient anti-hypertensive regimen is listed below:  Current Antihypertensives  amLODIPine tablet 2.5 mg, Daily, Oral  cloNIDine tablet 0.1 mg, Every 8 hours PRN, Oral  losartan tablet 100 mg, Daily, Oral  hydroCHLOROthiazide tablet 12.5 mg, Daily, Oral    Plan  - BP is controlled, no changes needed to their regimen    History of recurrent UTIs  Consult Urology    Idiopathic sclerosing mesenteritis  Pt with mesenteritis on CTAP  ESR/CRP pending  Will discuss IV steroids with attending   VTE Risk Mitigation (From admission, onward)           Ordered     IP VTE HIGH RISK PATIENT  Once         04/13/25 1826     Place sequential compression device  Until discontinued         04/13/25 1826                    Discharge Planning   ANDRA: 4/19/2025     Code Status: Full Code   Medical Readiness for Discharge Date:   Discharge Plan A: Home                        Annmarie Lopez PA-C  Department of Hospital Medicine   [place] [facility] ([facility])

## 2025-04-14 NOTE — ASSESSMENT & PLAN NOTE
Continue Mirapex him   Consult Urology    4/14 Urine culture wnl.  Urology recommendations pending.  Bladder scan pending.

## 2025-04-14 NOTE — SUBJECTIVE & OBJECTIVE
Past Medical History:   Diagnosis Date    Arthritis     Digestive disorder     Hyperlipidemia        Past Surgical History:   Procedure Laterality Date    BLADDER FULGURATION N/A 3/2/2023    Procedure: FULGURATION, BLADDER;  Surgeon: Reed Walton MD;  Location: [facility] OR;  Service: Urology;  Laterality: N/A;    CATARACT EXTRACTION Bilateral     CYSTOSCOPY W/ RETROGRADES Bilateral 3/2/2023    Procedure: CYSTOSCOPY, WITH RETROGRADE PYELOGRAM;  Surgeon: Reed Walton MD;  Location: [facility] OR;  Service: Urology;  Laterality: Bilateral;  cysto with bilateral retrogrades with possible bladder fulguration    HYSTERECTOMY      KNEE ARTHROSCOPY Bilateral        Review of patient's allergies indicates:   Allergen Reactions    Cipro [ciprofloxacin hcl] Itching    Vicodin [hydrocodone-acetaminophen] Hives    Adhesive Dermatitis     Long term adhesive sensitivity    Sulfa (sulfonamide antibiotics) Rash       No current facility-administered medications on file prior to encounter.     Current Outpatient Medications on File Prior to Encounter   Medication Sig    amLODIPine (NORVASC) 2.5 MG tablet Take 1 tablet (2.5 mg total) by mouth once daily.    celecoxib (CELEBREX) 100 MG capsule Take 100 mg by mouth once daily.    fluticasone propionate (FLONASE) 50 mcg/actuation nasal spray by Each Nostril route.    losartan-hydrochlorothiazide 100-12.5 mg (HYZAAR) 100-12.5 mg Tab Take 1 tablet by mouth once daily.    methenamine (HIPREX) 1 gram Tab Take 1 tablet (1 g total) by mouth 2 (two) times daily.    pantoprazole (PROTONIX) 40 MG tablet Take 40 mg by mouth once daily.    estradioL (ESTRACE) 0.01 % (0.1 mg/gram) vaginal cream Place 1 g vaginally twice a week.    trospium (SANCTURA) 20 mg Tab tablet Take 1 tablet (20 mg total) by mouth 2 (two) times daily.     Family History    None       Tobacco Use    Smoking status: Never    Smokeless tobacco: Never   Substance and Sexual Activity    Alcohol use: Never    Drug use: Never    Sexual  activity: Not on file     Review of Systems   Constitutional:  Negative for activity change, chills, fatigue, fever and unexpected weight change.   HENT:  Negative for sore throat and trouble swallowing.    Respiratory:  Negative for cough, chest tightness and shortness of breath.    Cardiovascular:  Negative for chest pain and leg swelling.   Gastrointestinal:  Positive for abdominal pain.   Endocrine: Negative for cold intolerance and heat intolerance.   Genitourinary:  Positive for difficulty urinating and dysuria.   Musculoskeletal:  Positive for arthralgias, back pain and gait problem. Negative for joint swelling.   Skin:  Negative for rash.   Neurological:  Negative for numbness.   Hematological:  Negative for adenopathy.   Psychiatric/Behavioral:  Negative for decreased concentration.      Objective:     Vital Signs (Most Recent):  Temp: 98.1 °F (36.7 °C) (04/14/25 0704)  Pulse: 65 (04/14/25 0800)  Resp: 20 (04/14/25 0704)  BP: 139/62 (04/14/25 0704)  SpO2: 95 % (04/14/25 0732) Vital Signs (24h Range):  Temp:  [97.3 °F (36.3 °C)-98.3 °F (36.8 °C)] 98.1 °F (36.7 °C)  Pulse:  [59-96] 65  Resp:  [18-20] 20  SpO2:  [93 %-97 %] 95 %  BP: (135-203)/(60-81) 139/62     Weight: 81.1 kg (178 lb 12.7 oz)  Body mass index is 31.67 kg/m².     Physical Exam  Constitutional:       Appearance: Normal appearance.   HENT:      Head:      Comments: Small abrasion and bruising over her right eye from a slip and fall that happened yesterday in the emergency room.  Cardiovascular:      Rate and Rhythm: Normal rate and regular rhythm.      Pulses: Normal pulses.      Heart sounds: No murmur heard.     No friction rub. No gallop.   Pulmonary:      Effort: Pulmonary effort is normal.      Breath sounds: Normal breath sounds.   Abdominal:      General: There is distension.      Tenderness: There is no abdominal tenderness.   Musculoskeletal:      Right lower leg: No edema.      Left lower leg: No edema.   Neurological:      General:  No focal deficit present.      Mental Status: She is alert and oriented to person, place, and time.      Motor: No weakness.                Significant Labs: All pertinent labs within the past 24 hours have been reviewed.  CBC:   Recent Labs   Lab 04/12/25  1559 04/13/25  1527   WBC 8.21 7.35   HGB 12.8 12.6   HCT 37.3 37.3    285     CMP:   Recent Labs   Lab 04/12/25  1559 04/13/25  1527   * 133*   K 4.0 4.2   CL 98 98   CO2 23 23   BUN 23 21   CREATININE 1.1 1.4   CALCIUM 9.4 9.3   ALBUMIN 4.0 4.0   BILITOT 0.4 0.5   ALKPHOS 107 107   AST 22 22   ALT 23 25   ANIONGAP 12 12     Urine Culture:   Recent Labs   Lab 04/12/25  1540   LABURIN Multiple organisms isolated. None in predominance. Repeat if clinically necessary.     Urine Studies:   Recent Labs   Lab 04/12/25  1540 04/13/25  1637   COLORU Yellow Yellow   APPEARANCEUA Clear Clear   PHUR 6.0 6.0   SPECGRAV <=1.005* <=1.005*   PROTEINUA Negative Negative   GLUCUA Negative Negative   BILIRUBINUA Negative Negative   OCCULTUA Trace* Negative   NITRITE Positive* Negative   UROBILINOGEN Negative Negative   LEUKOCYTESUR 2+* Negative   RBCUA 1  --    WBCUA 50*  --    BACTERIA Rare  --      Urine Culture 10,000 - 49,999 cfu/ml Proteus penneri Abnormal         Resulting Agency: [facility]     Susceptibility     Proteus penneri     ZE     Ampicillin >16 µg/ml Resistant     Ampicillin/Sulbactam <=8/4 µg/ml Sensitive     Cefazolin >16 µg/ml Resistant     Cefepime <=2 µg/ml Sensitive     Ceftriaxone >2 µg/ml Resistant     Ciprofloxacin <=0.25 µg/ml Sensitive     Ertapenem <=0.5 µg/ml Sensitive     Gentamicin <=2 µg/ml Sensitive     Levofloxacin <=0.5 µg/ml Sensitive     Meropenem <=1 µg/ml Sensitive     Nitrofurantoin >64 µg/ml Resistant     Piperacillin/Tazobactam <=8 µg/ml Sensitive     Tobramycin <=2 µg/ml Sensitive     Trimeth/Sulfa <=2/38 µg/ml Sensitive                 Significant Imaging: I have reviewed all pertinent imaging results/findings within the past  24 hours.  I have reviewed and interpreted all pertinent imaging results/findings within the past 24 hours.

## 2025-04-14 NOTE — ASSESSMENT & PLAN NOTE
Patient's blood pressure range in the last 24 hours was: BP  Min: 135/60  Max: 203/78.The patient's inpatient anti-hypertensive regimen is listed below:  Current Antihypertensives  amLODIPine tablet 2.5 mg, Daily, Oral  cloNIDine tablet 0.1 mg, Every 8 hours PRN, Oral  losartan tablet 100 mg, Daily, Oral  hydroCHLOROthiazide tablet 12.5 mg, Daily, Oral    Plan  - BP is controlled, no changes needed to their regimen

## 2025-04-15 ENCOUNTER — RESULTS FOLLOW-UP (OUTPATIENT)
Dept: UROLOGY | Facility: CLINIC | Age: 85
End: 2025-04-15

## 2025-04-15 VITALS
RESPIRATION RATE: 20 BRPM | HEIGHT: 63 IN | HEART RATE: 78 BPM | SYSTOLIC BLOOD PRESSURE: 115 MMHG | DIASTOLIC BLOOD PRESSURE: 56 MMHG | OXYGEN SATURATION: 96 % | WEIGHT: 178.81 LBS | BODY MASS INDEX: 31.68 KG/M2 | TEMPERATURE: 98 F

## 2025-04-15 PROBLEM — R82.90 ABNORMAL URINALYSIS: Status: ACTIVE | Noted: 2025-04-13

## 2025-04-15 PROCEDURE — 25000003 PHARM REV CODE 250: Performed by: PHYSICIAN ASSISTANT

## 2025-04-15 PROCEDURE — 94760 N-INVAS EAR/PLS OXIMETRY 1: CPT

## 2025-04-15 PROCEDURE — 99900035 HC TECH TIME PER 15 MIN (STAT)

## 2025-04-15 PROCEDURE — 63600175 PHARM REV CODE 636 W HCPCS: Performed by: SURGERY

## 2025-04-15 PROCEDURE — G0378 HOSPITAL OBSERVATION PER HR: HCPCS

## 2025-04-15 PROCEDURE — 25000003 PHARM REV CODE 250: Performed by: FAMILY MEDICINE

## 2025-04-15 PROCEDURE — 25000003 PHARM REV CODE 250: Performed by: SURGERY

## 2025-04-15 PROCEDURE — 94799 UNLISTED PULMONARY SVC/PX: CPT | Mod: XB

## 2025-04-15 PROCEDURE — 99214 OFFICE O/P EST MOD 30 MIN: CPT | Mod: ,,, | Performed by: SPECIALIST

## 2025-04-15 RX ADMIN — LOSARTAN POTASSIUM 100 MG: 50 TABLET, FILM COATED ORAL at 08:04

## 2025-04-15 RX ADMIN — AMLODIPINE BESYLATE 2.5 MG: 2.5 TABLET ORAL at 08:04

## 2025-04-15 RX ADMIN — Medication 1 CAPSULE: at 08:04

## 2025-04-15 RX ADMIN — MEROPENEM 1000 MG: 1 INJECTION, POWDER, FOR SOLUTION INTRAVENOUS at 03:04

## 2025-04-15 RX ADMIN — PANTOPRAZOLE SODIUM 40 MG: 40 TABLET, DELAYED RELEASE ORAL at 08:04

## 2025-04-15 RX ADMIN — HYDROCHLOROTHIAZIDE 12.5 MG: 12.5 TABLET ORAL at 08:04

## 2025-04-15 RX ADMIN — DOCUSATE SODIUM 100 MG: 100 CAPSULE, LIQUID FILLED ORAL at 08:04

## 2025-04-15 NOTE — DISCHARGE SUMMARY
[facility] ([facility])  [facility] Medicine  Discharge Summary      Patient Name: Ricky Hassan  MRN: [id number]  [facility]: [id number]  Patient Class: OP- Observation  Admission Date: 4/13/2025  Hospital Length of Stay: 0 days  Discharge Date and Time: 04/15/2025 10:29 AM  Attending Physician: Vik Bland MD   Discharging Provider: Annmarie Lopez PA-C  Primary Care Provider: Corey Valdes MD    Primary Care Team: Networked reference to record PCT     HPI:   Patient is a 84-year-old white female with a history of frequent urinary tract infections and has been treated multiple times in the clinics with oral antibiotics.  Unfortunately she is allergic to fluoroquinolones and sulfa.  Her last urine culture grew Proteus resistant to ampicillin, cefazolin, ceftriaxone, Macrodantin.  It is only sensitive to IV medications.  She was brought back in for further treatment.  Yesterday she had positive nitrites, 50 white blood cells in her urine.  She received 1 dose of Mirapex him in the emergency room yesterday.  Today the urine culture results posted.  Patient is being admitted for IV Mirapex him.  They would like a urology consult.    Urine Culture 10,000 - 49,999 cfu/ml Proteus penneri Abnormal         Resulting Agency: [facility]     Susceptibility     Proteus penneri     ZE     Ampicillin >16 µg/ml Resistant     Ampicillin/Sulbactam <=8/4 µg/ml Sensitive     Cefazolin >16 µg/ml Resistant     Cefepime <=2 µg/ml Sensitive     Ceftriaxone >2 µg/ml Resistant     Ciprofloxacin <=0.25 µg/ml Sensitive     Ertapenem <=0.5 µg/ml Sensitive     Gentamicin <=2 µg/ml Sensitive     Levofloxacin <=0.5 µg/ml Sensitive     Meropenem <=1 µg/ml Sensitive     Nitrofurantoin >64 µg/ml Resistant     Piperacillin/Tazobactam <=8 µg/ml Sensitive     Tobramycin <=2 µg/ml Sensitive     Trimeth/Sulfa <=2/38 µg/ml Sensitive                   * No surgery found *      Hospital Course:   PT HD stable on room air.  Admitted for  abdominal pain and drug resistant UTI.  Repeat culture wnl.  However, mesenteritis seen on CTAP.  Pt has generalized abdominal tightening.  Will discuss IV steroids with attending.  Urology recommendations pending.        4/15/25 PT HD stable on room air.  Cleared for discharge by urology.  Urine culture wnl.  F/u outpatient urology and gastroenterology.       Goals of Care Treatment Preferences:  Code Status: Full Code    Living Will: Yes                 Consults:   Consults (From admission, onward)          Status Ordering Provider     Inpatient consult to Urology  Once        Provider:  Bear Beyer MD    Acknowledged PRETTY AGUIRRE            Assessment & Plan  Abnormal urinalysis  Continue Mirapex him   Consult Urology    4/14 Urine culture wnl.  Urology recommendations pending.      4/15 Urology cleared for discharge and f/u outpatient urology.      Primary hypertension  Patient's blood pressure range in the last 24 hours was: BP  Min: 121/56  Max: 142/64.The patient's inpatient anti-hypertensive regimen is listed below:  Current Antihypertensives  amLODIPine tablet 2.5 mg, Daily, Oral  cloNIDine tablet 0.1 mg, Every 8 hours PRN, Oral  losartan tablet 100 mg, Daily, Oral  hydroCHLOROthiazide tablet 12.5 mg, Daily, Oral    Plan  - BP is controlled, no changes needed to their regimen    History of recurrent UTIs  Consult Urology    Idiopathic sclerosing mesenteritis  Pt with mesenteritis on CTAP  ESR/CRP pending  Will discuss IV steroids with attending     Discussed with Dr. Wong yesterday and no steroids at this time.  GI referral. ESR/CRP wnl   Final Active Diagnoses:    Diagnosis Date Noted POA    PRINCIPAL PROBLEM:  Abnormal urinalysis [R82.90] 04/13/2025 Yes    Idiopathic sclerosing mesenteritis [K65.4] 04/14/2025 Yes    History of recurrent UTIs [Z87.440] 03/02/2023 Yes    Primary hypertension [I10]  Yes      Problems Resolved During this Admission:       Discharged Condition: good    Disposition:  Home or Self Care    Follow Up:   Follow-up Information       Corey Valdes MD Follow up.    Specialty: Internal Medicine  Why: hospital follow up  Contact information:  [address] [place] [address]  [phone]                           Patient Instructions:      Ambulatory referral/consult to Gastroenterology   Standing Status: Future   Referral Priority: Routine Referral Type: Consultation   Referral Reason: Specialty Services Required   Requested Specialty: Gastroenterology   Number of Visits Requested: 1       Significant Diagnostic Studies: see A&P     Pending Diagnostic Studies:       Procedure Component Value Units Date/Time    [place] URINE HOLD [[id number]] Collected: 04/13/25 1637    Order Status: Sent Lab Status: In process Updated: 04/13/25 1639    Specimen: Urine     Urinalysis, Reflex to Urine Culture [[id number]]     Order Status: Sent Lab Status: No result     Specimen: Urine            Medications:  Reconciled Home Medications:      Medication List        CONTINUE taking these medications      amLODIPine 2.5 MG tablet  Commonly known as: NORVASC  Take 1 tablet (2.5 mg total) by mouth once daily.     estradioL 0.01 % (0.1 mg/gram) vaginal cream  Commonly known as: ESTRACE  Place 1 g vaginally twice a week.     fluticasone propionate 50 mcg/actuation nasal spray  Commonly known as: FLONASE  by Each Nostril route.     losartan-hydrochlorothiazide 100-12.5 mg 100-12.5 mg Tab  Commonly known as: HYZAAR  Take 1 tablet by mouth once daily.     pantoprazole 40 MG tablet  Commonly known as: PROTONIX  Take 40 mg by mouth once daily.            STOP taking these medications      celecoxib 100 MG capsule  Commonly known as: CeleBREX     methenamine 1 gram Tab  Commonly known as: HIPREX     trospium 20 mg Tab tablet  Commonly known as: sanctura              Indwelling Lines/Drains at time of discharge:   Lines/Drains/Airways       None                   Time spent on the discharge of patient: 25  minutes         Annmarie Lopez PA-C  Department of Hospital Medicine  [place] [facility] ([facility])

## 2025-04-15 NOTE — ASSESSMENT & PLAN NOTE
Continue Mirapex him   Consult Urology    4/14 Urine culture wnl.  Urology recommendations pending.      4/15 Urology cleared for discharge and f/u outpatient urology.

## 2025-04-15 NOTE — ASSESSMENT & PLAN NOTE
Pt with mesenteritis on CTAP  ESR/CRP pending  Will discuss IV steroids with attending     Discussed with Dr. Wong yesterday and no steroids at this time.  GI referral.

## 2025-04-15 NOTE — CONSULTS
"[facility] Surg (3rd Fl)  Urology  CONSULT    Patient Name: Ricky Hassan  MRN: [id number]  Admission Date: 4/13/2025  Code Status: Full Code   Attending Provider: YING JONES MD  Primary Care Physician: Corey Valdes MD  Principal Problem:Acute cystitis without hematuria    Subjective:     HPI: ACUTE CYSTITIS    Pleasant 84 y.o. F with hx of recurrent UTIs.  She is followed by Urology at [place].  She was transferred to [facility] for IV abx.  She has multidrug resistant UTI.  Recent Ucx, growing out Proteus, sensitive only to IV abx.  She has done well on Mirapex.  She has received three days of abx an f/u culture is negative.  CT scan without pyelo, abscess, hydronephrosis, or stones.    She mentions she had a "polyp" or "bladder scraping" two years ago.  I see an Op Note in EMR that she underwent cystoscopic evaluation in Feb. 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  She is scheduled for an outpatient cystoscopy by Dr. Walton in 2-3 weeks.    Past Medical History:   Diagnosis Date    Arthritis     Digestive disorder     Hyperlipidemia        Past Surgical History:   Procedure Laterality Date    BLADDER FULGURATION N/A 3/2/2023    Procedure: FULGURATION, BLADDER;  Surgeon: Reed Walton MD;  Location: [facility] OR;  Service: Urology;  Laterality: N/A;    CATARACT EXTRACTION Bilateral     CYSTOSCOPY W/ RETROGRADES Bilateral 3/2/2023    Procedure: CYSTOSCOPY, WITH RETROGRADE PYELOGRAM;  Surgeon: Reed Walton MD;  Location: [facility] OR;  Service: Urology;  Laterality: Bilateral;  cysto with bilateral retrogrades with possible bladder fulguration    HYSTERECTOMY      KNEE ARTHROSCOPY Bilateral        Review of patient's allergies indicates:   Allergen Reactions    Cipro [ciprofloxacin hcl] Itching    Vicodin [hydrocodone-acetaminophen] Hives    Adhesive Dermatitis     Long term adhesive sensitivity    Sulfa (sulfonamide antibiotics) Rash       Family History  "   None         Tobacco Use    Smoking status: Never    Smokeless tobacco: Never   Substance and Sexual Activity    Alcohol use: Never    Drug use: Never    Sexual activity: Not on file       Review of Systems    Objective:     Temp:  [96.4 °F (35.8 °C)-97.8 °F (36.6 °C)] 97.7 °F (36.5 °C)  Pulse:  [62-95] 62  Resp:  [14-20] 20  SpO2:  [94 %-96 %] 96 %  BP: (121-142)/(52-64) 121/56     Body mass index is 31.67 kg/m².    I/O last 3 completed shifts:  In: -   Out: 1200 [Urine:1200]  Bladder Scan Volume (mL): 0 mL (04/14/25 1145)  Post Void Cath Residual Output (mL): 300 mL (04/14/25 1003)    Lines/Drains/Airways       Peripheral Intravenous Line  Duration                  Peripheral IV - Single Lumen 04/13/25 1525 18 G Right Antecubital 1 day                    Physical Exam  Pulmonary:      Effort: Pulmonary effort is normal.   Neurological:      Mental Status: Mental status is at baseline.   Psychiatric:         Mood and Affect: Mood normal.         Significant Labs:  BMP:  Recent Labs   Lab 04/12/25  1559 04/13/25  1527   * 133*   K 4.0 4.2   CL 98 98   CO2 23 23   BUN 23 21   CREATININE 1.1 1.4   GLUCOSE 106 100   CALCIUM 9.4 9.3       CBC:  Recent Labs   Lab 04/12/25  1559 04/13/25  1527   WBC 8.21 7.35   HGB 12.8 12.6   HCT 37.3 37.3    285       Recent Lab Results         04/14/25  1005        CRP 2.3       Sed Rate 34               Urine Culture 10,000 - 49,999 cfu/ml Proteus penneri Abnormal          Resulting Agency: [facility]      Susceptibility              Proteus penneri       ZE       Ampicillin >16 µg/ml Resistant       Ampicillin/Sulbactam <=8/4 µg/ml Sensitive       Cefazolin >16 µg/ml Resistant       Cefepime <=2 µg/ml Sensitive       Ceftriaxone >2 µg/ml Resistant       Ciprofloxacin <=0.25 µg/ml Sensitive       Ertapenem <=0.5 µg/ml Sensitive       Gentamicin <=2 µg/ml Sensitive       Levofloxacin <=0.5 µg/ml Sensitive       Meropenem <=1 µg/ml Sensitive       Nitrofurantoin >64 µg/ml  Resistant       Piperacillin/Tazobactam <=8 µg/ml Sensitive       Tobramycin <=2 µg/ml Sensitive       Trimeth/Sulfa <=2/38 µg/ml Sensitive                         Significant Imaging:  All pertinent imaging results/findings from the past 24 hours have been reviewed.    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  Keep f/u appointment (for outpatient cystoscopy) with Dr. Walton.    YING JONES MD  Urology  [place] [facility] (3rd Fl)

## 2025-04-15 NOTE — SUBJECTIVE & OBJECTIVE
Past Medical History:   Diagnosis Date    Arthritis     Digestive disorder     Hyperlipidemia        Past Surgical History:   Procedure Laterality Date    BLADDER FULGURATION N/A 3/2/2023    Procedure: FULGURATION, BLADDER;  Surgeon: Reed Walton MD;  Location: [facility] OR;  Service: Urology;  Laterality: N/A;    CATARACT EXTRACTION Bilateral     CYSTOSCOPY W/ RETROGRADES Bilateral 3/2/2023    Procedure: CYSTOSCOPY, WITH RETROGRADE PYELOGRAM;  Surgeon: Reed Walton MD;  Location: [facility] OR;  Service: Urology;  Laterality: Bilateral;  cysto with bilateral retrogrades with possible bladder fulguration    HYSTERECTOMY      KNEE ARTHROSCOPY Bilateral        Review of patient's allergies indicates:   Allergen Reactions    Cipro [ciprofloxacin hcl] Itching    Vicodin [hydrocodone-acetaminophen] Hives    Adhesive Dermatitis     Long term adhesive sensitivity    Sulfa (sulfonamide antibiotics) Rash       No current facility-administered medications on file prior to encounter.     Current Outpatient Medications on File Prior to Encounter   Medication Sig    amLODIPine (NORVASC) 2.5 MG tablet Take 1 tablet (2.5 mg total) by mouth once daily.    celecoxib (CELEBREX) 100 MG capsule Take 100 mg by mouth once daily.    fluticasone propionate (FLONASE) 50 mcg/actuation nasal spray by Each Nostril route.    losartan-hydrochlorothiazide 100-12.5 mg (HYZAAR) 100-12.5 mg Tab Take 1 tablet by mouth once daily.    methenamine (HIPREX) 1 gram Tab Take 1 tablet (1 g total) by mouth 2 (two) times daily.    pantoprazole (PROTONIX) 40 MG tablet Take 40 mg by mouth once daily.    estradioL (ESTRACE) 0.01 % (0.1 mg/gram) vaginal cream Place 1 g vaginally twice a week.    trospium (SANCTURA) 20 mg Tab tablet Take 1 tablet (20 mg total) by mouth 2 (two) times daily.     Family History    None       Tobacco Use    Smoking status: Never    Smokeless tobacco: Never   Substance and Sexual Activity    Alcohol use: Never    Drug use: Never    Sexual  activity: Not on file     Review of Systems   Constitutional:  Negative for activity change, chills, fatigue, fever and unexpected weight change.   HENT:  Negative for sore throat and trouble swallowing.    Respiratory:  Negative for cough, chest tightness and shortness of breath.    Cardiovascular:  Negative for chest pain and leg swelling.   Gastrointestinal:  Negative for abdominal distention and abdominal pain.   Endocrine: Negative for cold intolerance and heat intolerance.   Genitourinary:  Negative for difficulty urinating and dysuria.   Musculoskeletal:  Positive for arthralgias, back pain and gait problem. Negative for joint swelling.   Skin:  Negative for rash.   Neurological:  Negative for numbness.   Hematological:  Negative for adenopathy.   Psychiatric/Behavioral:  Negative for decreased concentration.      Objective:     Vital Signs (Most Recent):  Temp: 97.7 °F (36.5 °C) (04/15/25 0705)  Pulse: 74 (04/15/25 1000)  Resp: 20 (04/15/25 0705)  BP: (!) 121/56 (04/15/25 0705)  SpO2: 96 % (04/15/25 0705) Vital Signs (24h Range):  Temp:  [96.4 °F (35.8 °C)-97.8 °F (36.6 °C)] 97.7 °F (36.5 °C)  Pulse:  [62-95] 74  Resp:  [14-20] 20  SpO2:  [94 %-96 %] 96 %  BP: (121-142)/(52-64) 121/56     Weight: 81.1 kg (178 lb 12.7 oz)  Body mass index is 31.67 kg/m².     Physical Exam  Constitutional:       Appearance: Normal appearance.   Cardiovascular:      Rate and Rhythm: Normal rate and regular rhythm.      Pulses: Normal pulses.      Heart sounds: No murmur heard.     No friction rub. No gallop.   Pulmonary:      Effort: Pulmonary effort is normal.      Breath sounds: Normal breath sounds.   Abdominal:      General: Bowel sounds are normal. There is distension.      Tenderness: There is no abdominal tenderness.   Musculoskeletal:      Right lower leg: No edema.      Left lower leg: No edema.   Neurological:      General: No focal deficit present.      Mental Status: She is alert and oriented to person, place, and  "time.      Motor: No weakness.                Significant Labs: All pertinent labs within the past 24 hours have been reviewed.  CBC:   Recent Labs   Lab 04/13/25  1527   WBC 7.35   HGB 12.6   HCT 37.3        CMP:   Recent Labs   Lab 04/13/25  1527   *   K 4.2   CL 98   CO2 23   BUN 21   CREATININE 1.4   CALCIUM 9.3   ALBUMIN 4.0   BILITOT 0.5   ALKPHOS 107   AST 22   ALT 25   ANIONGAP 12     Urine Culture:   No results for input(s): "LABURIN" in the last 48 hours.    Urine Studies:   Recent Labs   Lab 04/13/25  1637   COLORU Yellow   APPEARANCEUA Clear   PHUR 6.0   SPECGRAV <=1.005*   PROTEINUA Negative   GLUCUA Negative   BILIRUBINUA Negative   OCCULTUA Negative   NITRITE Negative   UROBILINOGEN Negative   LEUKOCYTESUR Negative     Urine Culture 10,000 - 49,999 cfu/ml Proteus penneri Abnormal         Resulting Agency: [facility]     Susceptibility     Proteus penneri     ZE     Ampicillin >16 µg/ml Resistant     Ampicillin/Sulbactam <=8/4 µg/ml Sensitive     Cefazolin >16 µg/ml Resistant     Cefepime <=2 µg/ml Sensitive     Ceftriaxone >2 µg/ml Resistant     Ciprofloxacin <=0.25 µg/ml Sensitive     Ertapenem <=0.5 µg/ml Sensitive     Gentamicin <=2 µg/ml Sensitive     Levofloxacin <=0.5 µg/ml Sensitive     Meropenem <=1 µg/ml Sensitive     Nitrofurantoin >64 µg/ml Resistant     Piperacillin/Tazobactam <=8 µg/ml Sensitive     Tobramycin <=2 µg/ml Sensitive     Trimeth/Sulfa <=2/38 µg/ml Sensitive                 Significant Imaging: I have reviewed all pertinent imaging results/findings within the past 24 hours.  I have reviewed and interpreted all pertinent imaging results/findings within the past 24 hours.  "

## 2025-04-15 NOTE — PROGRESS NOTES
[facility]  [facility] Medicine  Progress Note    Patient Name: Ricky Hassan  MRN: [id number]  Patient Class: OP- Observation   Admission Date: 4/13/2025  Length of Stay: 0 days  Attending Physician: Vik Bland MD  Primary Care Provider: Corey Valdes MD        Subjective     Principal Problem:Abnormal urinalysis        HPI:  Patient is a 84-year-old white female with a history of frequent urinary tract infections and has been treated multiple times in the clinics with oral antibiotics.  Unfortunately she is allergic to fluoroquinolones and sulfa.  Her last urine culture grew Proteus resistant to ampicillin, cefazolin, ceftriaxone, Macrodantin.  It is only sensitive to IV medications.  She was brought back in for further treatment.  Yesterday she had positive nitrites, 50 white blood cells in her urine.  She received 1 dose of Mirapex him in the emergency room yesterday.  Today the urine culture results posted.  Patient is being admitted for IV Mirapex him.  They would like a urology consult.    Urine Culture 10,000 - 49,999 cfu/ml Proteus penneri Abnormal         Resulting Agency: [facility]     Susceptibility     Proteus penneri     ZE     Ampicillin >16 µg/ml Resistant     Ampicillin/Sulbactam <=8/4 µg/ml Sensitive     Cefazolin >16 µg/ml Resistant     Cefepime <=2 µg/ml Sensitive     Ceftriaxone >2 µg/ml Resistant     Ciprofloxacin <=0.25 µg/ml Sensitive     Ertapenem <=0.5 µg/ml Sensitive     Gentamicin <=2 µg/ml Sensitive     Levofloxacin <=0.5 µg/ml Sensitive     Meropenem <=1 µg/ml Sensitive     Nitrofurantoin >64 µg/ml Resistant     Piperacillin/Tazobactam <=8 µg/ml Sensitive     Tobramycin <=2 µg/ml Sensitive     Trimeth/Sulfa <=2/38 µg/ml Sensitive                   Overview/Hospital Course:  PT HD stable on room air.  Admitted for abdominal pain and drug resistant UTI.  Repeat culture wnl.  However, mesenteritis seen on CTAP.  Pt has generalized abdominal tightening.   Will discuss IV steroids with attending.  Urology recommendations pending.        4/15/25 PT HD stable on room air.  Cleared for discharge by urology.  Urine culture wnl.  F/u outpatient urology and gastroenterology.      Past Medical History:   Diagnosis Date    Arthritis     Digestive disorder     Hyperlipidemia        Past Surgical History:   Procedure Laterality Date    BLADDER FULGURATION N/A 3/2/2023    Procedure: FULGURATION, BLADDER;  Surgeon: Reed Walton MD;  Location: [facility] OR;  Service: Urology;  Laterality: N/A;    CATARACT EXTRACTION Bilateral     CYSTOSCOPY W/ RETROGRADES Bilateral 3/2/2023    Procedure: CYSTOSCOPY, WITH RETROGRADE PYELOGRAM;  Surgeon: Reed Walton MD;  Location: [facility] OR;  Service: Urology;  Laterality: Bilateral;  cysto with bilateral retrogrades with possible bladder fulguration    HYSTERECTOMY      KNEE ARTHROSCOPY Bilateral        Review of patient's allergies indicates:   Allergen Reactions    Cipro [ciprofloxacin hcl] Itching    Vicodin [hydrocodone-acetaminophen] Hives    Adhesive Dermatitis     Long term adhesive sensitivity    Sulfa (sulfonamide antibiotics) Rash       No current facility-administered medications on file prior to encounter.     Current Outpatient Medications on File Prior to Encounter   Medication Sig    amLODIPine (NORVASC) 2.5 MG tablet Take 1 tablet (2.5 mg total) by mouth once daily.    celecoxib (CELEBREX) 100 MG capsule Take 100 mg by mouth once daily.    fluticasone propionate (FLONASE) 50 mcg/actuation nasal spray by Each Nostril route.    losartan-hydrochlorothiazide 100-12.5 mg (HYZAAR) 100-12.5 mg Tab Take 1 tablet by mouth once daily.    methenamine (HIPREX) 1 gram Tab Take 1 tablet (1 g total) by mouth 2 (two) times daily.    pantoprazole (PROTONIX) 40 MG tablet Take 40 mg by mouth once daily.    estradioL (ESTRACE) 0.01 % (0.1 mg/gram) vaginal cream Place 1 g vaginally twice a week.    trospium (SANCTURA) 20 mg Tab tablet Take 1 tablet  (20 mg total) by mouth 2 (two) times daily.     Family History    None       Tobacco Use    Smoking status: Never    Smokeless tobacco: Never   Substance and Sexual Activity    Alcohol use: Never    Drug use: Never    Sexual activity: Not on file     Review of Systems   Constitutional:  Negative for activity change, chills, fatigue, fever and unexpected weight change.   HENT:  Negative for sore throat and trouble swallowing.    Respiratory:  Negative for cough, chest tightness and shortness of breath.    Cardiovascular:  Negative for chest pain and leg swelling.   Gastrointestinal:  Negative for abdominal distention and abdominal pain.   Endocrine: Negative for cold intolerance and heat intolerance.   Genitourinary:  Negative for difficulty urinating and dysuria.   Musculoskeletal:  Positive for arthralgias, back pain and gait problem. Negative for joint swelling.   Skin:  Negative for rash.   Neurological:  Negative for numbness.   Hematological:  Negative for adenopathy.   Psychiatric/Behavioral:  Negative for decreased concentration.      Objective:     Vital Signs (Most Recent):  Temp: 97.7 °F (36.5 °C) (04/15/25 0705)  Pulse: 74 (04/15/25 1000)  Resp: 20 (04/15/25 0705)  BP: (!) 121/56 (04/15/25 0705)  SpO2: 96 % (04/15/25 0705) Vital Signs (24h Range):  Temp:  [96.4 °F (35.8 °C)-97.8 °F (36.6 °C)] 97.7 °F (36.5 °C)  Pulse:  [62-95] 74  Resp:  [14-20] 20  SpO2:  [94 %-96 %] 96 %  BP: (121-142)/(52-64) 121/56     Weight: 81.1 kg (178 lb 12.7 oz)  Body mass index is 31.67 kg/m².     Physical Exam  Constitutional:       Appearance: Normal appearance.   Cardiovascular:      Rate and Rhythm: Normal rate and regular rhythm.      Pulses: Normal pulses.      Heart sounds: No murmur heard.     No friction rub. No gallop.   Pulmonary:      Effort: Pulmonary effort is normal.      Breath sounds: Normal breath sounds.   Abdominal:      General: Bowel sounds are normal. There is distension.      Tenderness: There is no  "abdominal tenderness.   Musculoskeletal:      Right lower leg: No edema.      Left lower leg: No edema.   Neurological:      General: No focal deficit present.      Mental Status: She is alert and oriented to person, place, and time.      Motor: No weakness.                Significant Labs: All pertinent labs within the past 24 hours have been reviewed.  CBC:   Recent Labs   Lab 04/13/25  1527   WBC 7.35   HGB 12.6   HCT 37.3        CMP:   Recent Labs   Lab 04/13/25  1527   *   K 4.2   CL 98   CO2 23   BUN 21   CREATININE 1.4   CALCIUM 9.3   ALBUMIN 4.0   BILITOT 0.5   ALKPHOS 107   AST 22   ALT 25   ANIONGAP 12     Urine Culture:   No results for input(s): "LABURIN" in the last 48 hours.    Urine Studies:   Recent Labs   Lab 04/13/25  1637   COLORU Yellow   APPEARANCEUA Clear   PHUR 6.0   SPECGRAV <=1.005*   PROTEINUA Negative   GLUCUA Negative   BILIRUBINUA Negative   OCCULTUA Negative   NITRITE Negative   UROBILINOGEN Negative   LEUKOCYTESUR Negative     Urine Culture 10,000 - 49,999 cfu/ml Proteus penneri Abnormal         Resulting Agency: [facility]     Susceptibility     Proteus penneri     ZE     Ampicillin >16 µg/ml Resistant     Ampicillin/Sulbactam <=8/4 µg/ml Sensitive     Cefazolin >16 µg/ml Resistant     Cefepime <=2 µg/ml Sensitive     Ceftriaxone >2 µg/ml Resistant     Ciprofloxacin <=0.25 µg/ml Sensitive     Ertapenem <=0.5 µg/ml Sensitive     Gentamicin <=2 µg/ml Sensitive     Levofloxacin <=0.5 µg/ml Sensitive     Meropenem <=1 µg/ml Sensitive     Nitrofurantoin >64 µg/ml Resistant     Piperacillin/Tazobactam <=8 µg/ml Sensitive     Tobramycin <=2 µg/ml Sensitive     Trimeth/Sulfa <=2/38 µg/ml Sensitive                 Significant Imaging: I have reviewed all pertinent imaging results/findings within the past 24 hours.  I have reviewed and interpreted all pertinent imaging results/findings within the past 24 hours.      Assessment & Plan  Abnormal urinalysis  Continue Mirapex him   Consult " Urology    4/14 Urine culture wnl.  Urology recommendations pending.      4/15 Urology cleared for discharge and f/u outpatient urology.      Primary hypertension  Patient's blood pressure range in the last 24 hours was: BP  Min: 121/56  Max: 142/64.The patient's inpatient anti-hypertensive regimen is listed below:  Current Antihypertensives  amLODIPine tablet 2.5 mg, Daily, Oral  cloNIDine tablet 0.1 mg, Every 8 hours PRN, Oral  losartan tablet 100 mg, Daily, Oral  hydroCHLOROthiazide tablet 12.5 mg, Daily, Oral    Plan  - BP is controlled, no changes needed to their regimen    History of recurrent UTIs  Consult Urology    Idiopathic sclerosing mesenteritis  Pt with mesenteritis on CTAP  ESR/CRP pending  Will discuss IV steroids with attending     Discussed with Dr. Wong yesterday and no steroids at this time.  GI referral.   VTE Risk Mitigation (From admission, onward)           Ordered     IP VTE HIGH RISK PATIENT  Once         04/13/25 1826     Place sequential compression device  Until discontinued         04/13/25 1826                    Discharge Planning   ANDRA: 4/19/2025     Code Status: Full Code   Medical Readiness for Discharge Date:   Discharge Plan A: Home                        Annmarie Lopez PA-C  Department of Hospital Medicine   [place] - Med Surg (3rd Fl)

## 2025-04-15 NOTE — NURSING
Patient discharge note was updated by physician. Discussed discharge with patient. Printed discharge paperwork and provided patient with copy. reading over important points to patient. All of patients questions answered at [facility] this time. Patient transported home via wheel chair accompanied by [facility] surge staff. Vss stable.

## 2025-04-15 NOTE — ASSESSMENT & PLAN NOTE
Patient's blood pressure range in the last 24 hours was: BP  Min: 121/56  Max: 142/64.The patient's inpatient anti-hypertensive regimen is listed below:  Current Antihypertensives  amLODIPine tablet 2.5 mg, Daily, Oral  cloNIDine tablet 0.1 mg, Every 8 hours PRN, Oral  losartan tablet 100 mg, Daily, Oral  hydroCHLOROthiazide tablet 12.5 mg, Daily, Oral    Plan  - BP is controlled, no changes needed to their regimen

## 2025-04-15 NOTE — ASSESSMENT & PLAN NOTE
Pt with mesenteritis on CTAP  ESR/CRP pending  Will discuss IV steroids with attending     Discussed with Dr. Wong yesterday and no steroids at this time.  GI referral. ESR/CRP wnl

## 2025-04-19 LAB — BACTERIA BLD CULT: NORMAL

## 2025-04-22 ENCOUNTER — TELEPHONE (OUTPATIENT)
Dept: UROLOGY | Facility: CLINIC | Age: 85
End: 2025-04-22
Payer: MEDICARE

## 2025-04-22 DIAGNOSIS — N30.00 ACUTE CYSTITIS WITHOUT HEMATURIA: Primary | ICD-10-CM

## 2025-04-22 NOTE — TELEPHONE ENCOUNTER
Patient was discharged from inpatient stay (for UTI) on 4/15. She has cysto appt with Dr. Walton in two weeks. She is requesting another urine culture. Please advise.

## 2025-04-22 NOTE — TELEPHONE ENCOUNTER
----- Message from Med Assistant Brito sent at 4/22/2025 10:47 AM CDT -----  Regarding: FW: Sarah Beth(daughter)    ----- Message -----  From: Martha Baker  Sent: 4/22/2025  10:44 AM CDT  To: [facility] Staff  Subject: Sarah Beth(daughter)                                Caller is requesting to schedule their Lab appointment prior to annual appointment.  Order is not listed in EPIC.  Please enter order and contact patient to schedule.Name of Caller:Sarah Beth(noy)Preferred Date and Time of Labs: As soon as possible patient thinks she may have a bladder infection again and would like to have a urine sample dropped off Date of EPP Appointment:[facility] would they [facility] lab [facility] LabWould the patient rather a call back or a response via [facility]? [facility] Call Back Number:.[phone] Information:

## 2025-04-22 NOTE — TELEPHONE ENCOUNTER
I called and spoke to patient's daughter Vanessa, told her order for culture was placed. She states she will take her Mom to the lab ASAP.

## 2025-04-23 ENCOUNTER — LAB VISIT (OUTPATIENT)
Dept: LAB | Facility: HOSPITAL | Age: 85
End: 2025-04-23
Attending: NURSE PRACTITIONER
Payer: MEDICARE

## 2025-04-23 DIAGNOSIS — N30.00 ACUTE CYSTITIS WITHOUT HEMATURIA: ICD-10-CM

## 2025-04-23 PROCEDURE — 87186 SC STD MICRODIL/AGAR DIL: CPT

## 2025-04-26 LAB — BACTERIA UR CULT: ABNORMAL

## 2025-04-28 ENCOUNTER — RESULTS FOLLOW-UP (OUTPATIENT)
Dept: UROLOGY | Facility: CLINIC | Age: 85
End: 2025-04-28

## 2025-04-28 ENCOUNTER — TELEPHONE (OUTPATIENT)
Dept: UROLOGY | Facility: CLINIC | Age: 85
End: 2025-04-28
Payer: MEDICARE

## 2025-04-28 DIAGNOSIS — N30.00 ACUTE CYSTITIS WITHOUT HEMATURIA: Primary | ICD-10-CM

## 2025-04-28 RX ORDER — CEFDINIR 300 MG/1
300 CAPSULE ORAL 2 TIMES DAILY
Qty: 20 CAPSULE | Refills: 0 | Status: SHIPPED | OUTPATIENT
Start: 2025-04-28 | End: 2025-05-08

## 2025-04-28 NOTE — TELEPHONE ENCOUNTER
Patient saw her PCP Corey Valdes on Friday, 4/26/25 and he prescribed Fosfomycin 3 gm pack (taken q72h) to get the patient some relief while waiting for culture specific abx from our office. Patient is aware that Dr. Dorantes has called in Cefdinir today for her UTI. They are asking do they stop the Fosfomycin and start the Cefdinir? They also asked if it is ok to proceed with upcoming office cysto on 4/30/25 being done for frequent UTI.

## 2025-04-28 NOTE — TELEPHONE ENCOUNTER
----- Message from Reginald sent at 4/28/2025  3:34 PM CDT -----  Contact: pt daughter  Type: Requesting to speak with Reji Called: PT's daughter Regarding: would like to speak to Mariluz regarding antibiotics she was prescribed cefdinir (OMNICEF) 300 MG capsule.  She was also prescribed antibiotics by another doctor REGINA. Can she take both? And is it safe to still get her procedure taking the medication?  Would the patient rather a call back or a response via [facility]? Call [facility] Call Back Number: [phone] Additional Information:

## 2025-04-29 ENCOUNTER — TELEPHONE (OUTPATIENT)
Dept: UROLOGY | Facility: CLINIC | Age: 85
End: 2025-04-29
Payer: MEDICARE

## 2025-04-29 NOTE — TELEPHONE ENCOUNTER
----- Message from Lisa Dorantes DNP sent at 4/28/2025  2:24 PM CDT -----  Please inform patient via telephone that her urine cx came back positive for a UTI. Script for cefdinir sent to [facility]. Please start antibiotic today. Thanks.       ----- Message -----  From: Lab, Background User  Sent: 4/25/2025  12:09 PM CDT  To: Lisa Dorantes DNP

## 2025-04-29 NOTE — TELEPHONE ENCOUNTER
I called and spoke to patient's daughter Vanessa, gave her Dr. Dorantes instructions to start Cefdinir and stop fosfomycin, also let them know we are planning to move forward with cysto in clinic tomorrow since she has been on abx.

## 2025-04-29 NOTE — TELEPHONE ENCOUNTER
Called and spoke with patient's daughter about urine cx results and that antibiotics was sent in to start taking right away. She verbally understood and stated that she had talked to Mariluz yesterday. She asked if they were still able to do cysto tomorrow as planned and Mariluz stated that she was waiting to hear from Lisa about that.

## 2025-04-30 ENCOUNTER — PROCEDURE VISIT (OUTPATIENT)
Dept: UROLOGY | Facility: CLINIC | Age: 85
End: 2025-04-30
Payer: MEDICARE

## 2025-04-30 VITALS
WEIGHT: 178 LBS | BODY MASS INDEX: 31.53 KG/M2 | HEART RATE: 75 BPM | DIASTOLIC BLOOD PRESSURE: 75 MMHG | SYSTOLIC BLOOD PRESSURE: 166 MMHG

## 2025-04-30 DIAGNOSIS — Z01.810 PREOP CARDIOVASCULAR EXAM: ICD-10-CM

## 2025-04-30 DIAGNOSIS — N30.80 CYSTITIS CYSTICA: Primary | ICD-10-CM

## 2025-04-30 DIAGNOSIS — Z87.440 HISTORY OF RECURRENT UTIS: ICD-10-CM

## 2025-04-30 DIAGNOSIS — R10.30 LOWER ABDOMINAL PAIN: ICD-10-CM

## 2025-04-30 PROCEDURE — 52000 CYSTOURETHROSCOPY: CPT | Mod: S$GLB,,, | Performed by: UROLOGY

## 2025-04-30 RX ORDER — SODIUM CHLORIDE 9 MG/ML
INJECTION, SOLUTION INTRAVENOUS CONTINUOUS
OUTPATIENT
Start: 2025-04-30

## 2025-04-30 NOTE — PATIENT INSTRUCTIONS
Plan to take patient to operating room on 5/8/25 for cystoscopy and bladder fulguration  Patient to continue cefdinir  Follow-up in 6 weeks

## 2025-04-30 NOTE — PROCEDURES
Cystoscopy    Date/Time: 4/30/2025 2:30 PM    Performed by: Reed Walton MD  Authorized by: Reed Walton MD    Consent Done?:  Yes (Written)  Timeout: prior to procedure the correct patient, procedure, and site was verified    Prep: patient was prepped and draped in usual sterile fashion    Local anesthesia used?: Yes    Anesthesia:  Intraurethral instillation  Local anesthetic:  Lidocaine 2% topical gel  Anesthetic total (ml):  10  Indications: recurrent UTIs    Indications comment:  History of Cystitis cystica  Position:  Dorsal lithotomy  Anesthesia:  Intraurethral instillation  Patient sedated?: No    Preparation: Patient was prepped and draped in usual sterile fashion    Scope type:  Flexible cystoscopeNoNo  Stent inserted: No    Stent removed: No    External exam normal: Yes    Digital exam performed: No    Urethra normal: Yes    Bladder neck normal: No (Multiple cystitis cystica lesions in the bladder neck and trigonal area)    Bladder normal: No (Multiple cystitis cystiac lesions throughout the bladder mucosa)    Comments:      Greater than 300 - 2 mm cystitis cystica lesions throughout the bladder, many with purulent appearing material inside

## 2025-05-02 ENCOUNTER — OFFICE VISIT (OUTPATIENT)
Dept: GASTROENTEROLOGY | Facility: CLINIC | Age: 85
End: 2025-05-02
Payer: MEDICARE

## 2025-05-02 VITALS
DIASTOLIC BLOOD PRESSURE: 71 MMHG | HEIGHT: 63 IN | SYSTOLIC BLOOD PRESSURE: 143 MMHG | WEIGHT: 175.5 LBS | HEART RATE: 63 BPM | BODY MASS INDEX: 31.1 KG/M2

## 2025-05-02 DIAGNOSIS — R10.30 LOWER ABDOMINAL PAIN: ICD-10-CM

## 2025-05-02 PROCEDURE — 1159F MED LIST DOCD IN RCRD: CPT | Mod: CPTII,S$GLB,, | Performed by: NURSE PRACTITIONER

## 2025-05-02 PROCEDURE — 3288F FALL RISK ASSESSMENT DOCD: CPT | Mod: CPTII,S$GLB,, | Performed by: NURSE PRACTITIONER

## 2025-05-02 PROCEDURE — 99999 PR PBB SHADOW E&M-EST. PATIENT-LVL IV: CPT | Mod: PBBFAC,,, | Performed by: NURSE PRACTITIONER

## 2025-05-02 PROCEDURE — 1160F RVW MEDS BY RX/DR IN RCRD: CPT | Mod: CPTII,S$GLB,, | Performed by: NURSE PRACTITIONER

## 2025-05-02 PROCEDURE — 99204 OFFICE O/P NEW MOD 45 MIN: CPT | Mod: S$GLB,,, | Performed by: NURSE PRACTITIONER

## 2025-05-02 PROCEDURE — 1157F ADVNC CARE PLAN IN RCRD: CPT | Mod: CPTII,S$GLB,, | Performed by: NURSE PRACTITIONER

## 2025-05-02 PROCEDURE — 3078F DIAST BP <80 MM HG: CPT | Mod: CPTII,S$GLB,, | Performed by: NURSE PRACTITIONER

## 2025-05-02 PROCEDURE — 1125F AMNT PAIN NOTED PAIN PRSNT: CPT | Mod: CPTII,S$GLB,, | Performed by: NURSE PRACTITIONER

## 2025-05-02 PROCEDURE — 3077F SYST BP >= 140 MM HG: CPT | Mod: CPTII,S$GLB,, | Performed by: NURSE PRACTITIONER

## 2025-05-02 PROCEDURE — 1100F PTFALLS ASSESS-DOCD GE2>/YR: CPT | Mod: CPTII,S$GLB,, | Performed by: NURSE PRACTITIONER

## 2025-05-04 ENCOUNTER — PATIENT MESSAGE (OUTPATIENT)
Dept: UROLOGY | Facility: CLINIC | Age: 85
End: 2025-05-04
Payer: MEDICARE

## 2025-05-09 ENCOUNTER — HOSPITAL ENCOUNTER (EMERGENCY)
Facility: HOSPITAL | Age: 85
Discharge: HOME | End: 2025-05-09
Attending: STUDENT IN AN ORGANIZED HEALTH CARE EDUCATION/TRAINING PROGRAM
Payer: MEDICARE

## 2025-05-09 ENCOUNTER — PATIENT MESSAGE (OUTPATIENT)
Dept: UROLOGY | Facility: CLINIC | Age: 85
End: 2025-05-09
Payer: MEDICARE

## 2025-05-09 VITALS
HEIGHT: 63 IN | WEIGHT: 174.63 LBS | SYSTOLIC BLOOD PRESSURE: 150 MMHG | HEART RATE: 63 BPM | DIASTOLIC BLOOD PRESSURE: 68 MMHG | RESPIRATION RATE: 20 BRPM | TEMPERATURE: 98 F | BODY MASS INDEX: 30.94 KG/M2 | OXYGEN SATURATION: 99 %

## 2025-05-09 DIAGNOSIS — R33.9 URINARY RETENTION: Primary | ICD-10-CM

## 2025-05-09 LAB
BACTERIA #/AREA URNS HPF: ABNORMAL /HPF
BILIRUB UR QL STRIP.AUTO: NEGATIVE
CLARITY UR: CLEAR
COLOR UR AUTO: YELLOW
GLUCOSE UR QL STRIP: NEGATIVE
HGB UR QL STRIP: ABNORMAL
HOLD SPECIMEN: NORMAL
HYALINE CASTS #/AREA URNS LPF: 1 /LPF (ref 0–1)
KETONES UR QL STRIP: NEGATIVE
LEUKOCYTE ESTERASE UR QL STRIP: NEGATIVE
MICROSCOPIC COMMENT: ABNORMAL
NITRITE UR QL STRIP: NEGATIVE
PH UR STRIP: 6 [PH]
PROT UR QL STRIP: ABNORMAL
RBC #/AREA URNS HPF: 65 /HPF (ref 0–4)
SP GR UR STRIP: 1.01
UROBILINOGEN UR STRIP-ACNC: NEGATIVE EU/DL
WBC #/AREA URNS HPF: 2 /HPF (ref 0–5)

## 2025-05-09 PROCEDURE — 99284 EMERGENCY DEPT VISIT MOD MDM: CPT | Mod: 25

## 2025-05-09 PROCEDURE — 51702 INSERT TEMP BLADDER CATH: CPT

## 2025-05-09 PROCEDURE — 81003 URINALYSIS AUTO W/O SCOPE: CPT | Performed by: STUDENT IN AN ORGANIZED HEALTH CARE EDUCATION/TRAINING PROGRAM

## 2025-05-09 RX ORDER — TRAMADOL HYDROCHLORIDE 50 MG/1
50 TABLET, FILM COATED ORAL EVERY 6 HOURS PRN
Qty: 6 TABLET | Refills: 0 | Status: SHIPPED | OUTPATIENT
Start: 2025-05-09

## 2025-05-09 NOTE — ED PROVIDER NOTES
Encounter Date: 5/9/2025       History     Chief Complaint   Patient presents with    Urinary Retention     Pt arrives to the ed with c/o urinary retention since last night. Pt was told to go to the office for bladder scan but came to the ed instead.      84-year-old with history of cystitis cystica, had a urologic procedure yesterday to remove the cysts, and since last night has had urinary retention and lower abdominal pain.  Patient denies fever, nausea, vomiting.  Has been taking tramadol after her procedure with some improvement in her pain.  Patient received a message asking her to come into the clinic for a bladder scan, however by that point she was already in the emergency room here.  Patient reports she has put out about 20 mL of fluid in the last 24 hours.      Review of patient's allergies indicates:   Allergen Reactions    Cipro [ciprofloxacin hcl] Itching    Vicodin [hydrocodone-acetaminophen] Hives    Adhesive Dermatitis     Long term adhesive sensitivity    Sulfa (sulfonamide antibiotics) Rash     Past Medical History:   Diagnosis Date    Arthritis     Digestive disorder     Hyperlipidemia     Hypertension      Past Surgical History:   Procedure Laterality Date    BLADDER FULGURATION N/A 03/02/2023    Procedure: FULGURATION, BLADDER;  Surgeon: Reed Walton MD;  Location: [facility] OR;  Service: Urology;  Laterality: N/A;    BLADDER FULGURATION N/A 5/8/2025    Procedure: CYSTOSCOPY W/ FULGURATION, BLADDER;  Surgeon: Reed Walton MD;  Location: [facility] OR;  Service: Urology;  Laterality: N/A;    CATARACT EXTRACTION Bilateral     CYSTOSCOPY W/ RETROGRADES Bilateral 03/02/2023    Procedure: CYSTOSCOPY, WITH RETROGRADE PYELOGRAM;  Surgeon: Reed Walton MD;  Location: [facility] OR;  Service: Urology;  Laterality: Bilateral;  cysto with bilateral retrogrades with possible bladder fulguration    HYSTERECTOMY      KNEE ARTHROSCOPY Bilateral     LUMBAR SPINE SURGERY  10/31/2024     No family history on  file.  Social History[1]  Review of Systems   Constitutional:  Negative for fever.   HENT:  Negative for sore throat.    Respiratory:  Negative for shortness of breath.    Cardiovascular:  Negative for chest pain.   Gastrointestinal:  Positive for abdominal pain. Negative for diarrhea, nausea and vomiting.   Genitourinary:  Positive for difficulty urinating. Negative for dysuria.   Musculoskeletal:  Negative for back pain.   Skin:  Negative for rash.   Neurological:  Negative for weakness.   Hematological:  Does not bruise/bleed easily.       Physical Exam     Initial Vitals [05/09/25 0959]   BP Pulse Resp Temp SpO2   (!) 166/74 73 20 98.5 °F (36.9 °C) 97 %      MAP       --         Physical Exam    Nursing note and vitals reviewed.  Constitutional: She appears well-developed.   HENT:   Head: Normocephalic.   Eyes: Pupils are equal, round, and reactive to light.   Neck:   Normal range of motion.  Cardiovascular:            No murmur heard.  Pulmonary/Chest: No respiratory distress.   Abdominal: Abdomen is soft.   Mild lower abdominal tenderness to palpation and distention.  No other tenderness to palpation.  No rebound or guarding.  No CVA tenderness.   Musculoskeletal:         General: No edema.      Cervical back: Normal range of motion.     Neurological: She is alert.   Skin: Skin is warm.   Psychiatric: She has a normal mood and affect.         ED Course   Procedures  Labs Reviewed   URINALYSIS, REFLEX TO URINE CULTURE          Imaging Results    None          Medications - No data to display  Medical Decision Making  DDX: Urinary retention - likely result of recent procedure and postop swelling.  R/o UTI. Unlikely pyelonephritis as no fever, systemic symptoms, or CVAT. Unlikely cauda equina given normal neuro exam and no reports of back pain, weight loss, trauma, IVDU, fevers, weakness/numbness, or saddle anesthesia.  Dx: UA, UCX.  Tx: Analgesia PRN. Treatment/consult as indicated by studies.   Dispo: If  studies WNL, symptoms controlled, discharge to follow up with urology within 1 week with supportive care recommendations and RTED precautions.                                          Clinical Impression:  Final diagnoses:  [R33.9] Urinary retention (Primary)                   [1]   Social History  Tobacco Use    Smoking status: Never    Smokeless tobacco: Never   Substance Use Topics    Alcohol use: Never    Drug use: Never        Mo Stringer MD  05/09/25 1018

## 2025-05-09 NOTE — ED NOTES
Discharge instructions reviewed with patient. Patient verbalized understanding of all discharge information and needed follow-ups/referrals. Medications for pickup reviewed with patient and patient verbalized understanding of medication pick-up and instructions. Pt requested to keep zarco cath bag due to urinates a lot at night. MD Stringer made aware of pt request. Pt educated on how to empty, clean and manage cath care. Pt also educated on how to deflate cath balloon as requested per Urology. PT daughter states she has already made f/u appointment on Tuesday and will have cath removed at the doctor appointment due to not feeling comfortable removing at home as Urology directed.

## 2025-05-19 ENCOUNTER — PATIENT MESSAGE (OUTPATIENT)
Dept: UROLOGY | Facility: CLINIC | Age: 85
End: 2025-05-19
Payer: MEDICARE

## 2025-05-29 ENCOUNTER — OFFICE VISIT (OUTPATIENT)
Dept: UROLOGY | Facility: CLINIC | Age: 85
End: 2025-05-29
Payer: MEDICARE

## 2025-05-29 VITALS
SYSTOLIC BLOOD PRESSURE: 104 MMHG | WEIGHT: 178.81 LBS | BODY MASS INDEX: 31.68 KG/M2 | HEART RATE: 67 BPM | OXYGEN SATURATION: 95 % | HEIGHT: 63 IN | DIASTOLIC BLOOD PRESSURE: 65 MMHG

## 2025-05-29 DIAGNOSIS — Z87.440 HISTORY OF RECURRENT UTIS: ICD-10-CM

## 2025-05-29 DIAGNOSIS — R35.1 NOCTURIA: ICD-10-CM

## 2025-05-29 DIAGNOSIS — N39.41 URGE URINARY INCONTINENCE: ICD-10-CM

## 2025-05-29 DIAGNOSIS — R39.15 URINARY URGENCY: ICD-10-CM

## 2025-05-29 DIAGNOSIS — N30.80 CYSTITIS CYSTICA: ICD-10-CM

## 2025-05-29 DIAGNOSIS — R32 ENURESIS: ICD-10-CM

## 2025-05-29 DIAGNOSIS — Z98.890 POST-OPERATIVE STATE: Primary | ICD-10-CM

## 2025-05-29 PROCEDURE — 87186 SC STD MICRODIL/AGAR DIL: CPT | Performed by: NURSE PRACTITIONER

## 2025-05-29 PROCEDURE — 99999 PR PBB SHADOW E&M-EST. PATIENT-LVL IV: CPT | Mod: PBBFAC,,, | Performed by: NURSE PRACTITIONER

## 2025-05-29 RX ORDER — TRIAMCINOLONE ACETONIDE 1 MG/G
CREAM TOPICAL
COMMUNITY
Start: 2025-02-19

## 2025-05-29 NOTE — PATIENT INSTRUCTIONS
Urine cx today  Start trial of vibegron (Gemtesa) 75 mg daily for overactive bladder symptoms.   Follow-up in 2 weeks via telephone encounter.

## 2025-05-29 NOTE — PROGRESS NOTES
Subjective:       Patient ID: Ricky Hassan is a 84 y.o. female.    Chief Complaint: Post-op Evaluation    History of Present Illness    CHIEF COMPLAINT:  Patient presents today for post-operative follow up after bladder fulguration for cystitis cystica by Dr. Walton on 5/8/2025. Patient is here today with her daughter.     POST-OPERATIVE COURSE:  She experienced urinary retention immediately post-surgery, initially only able to void minimal amounts despite having urge. Due to significant post-void residual, she required catheterization at [facility]. The catheter remained in place from 5/9-5/12. It was removal by a family member who is a nurse.    CURRENT URINARY SYMPTOMS:  She reports nocturia 3-4 times per night with occasional nocturnal incontinence requiring frequent changes of large protective pads (size 6-7). Daytime urination frequency correlates with fluid intake. She experiences urgency incontinence, sometimes unable to reach the bathroom in time, and positional incontinence particularly when transitioning from sitting to standing to go to the restroom. She denies stress incontinence with laughing, coughing, or sneezing. Previous medications for these symptoms have been ineffective (solifenacin and trospium). Due to post-op urinary retention, patient is hesitant on receiving anesthesia again.     ROS:  General: -fever, -chills, -fatigue, -weight gain, -weight loss  Eyes: -vision changes, -redness, -discharge  ENT: -ear pain, -nasal congestion, -sore throat  Cardiovascular: -chest pain, -palpitations, -lower extremity edema  Respiratory: -cough, -shortness of breath  Gastrointestinal: -abdominal pain, -nausea, -vomiting, -diarrhea, -constipation, -blood in stool  Genitourinary: -dysuria, -hematuria, -frequency, +urinary incontinence, +urgency, +nocturia, +enuresis  Musculoskeletal: -joint pain, -muscle pain  Skin: -rash, -lesion  Neurological: -headache, -dizziness, -numbness,  -tingling  Psychiatric: -anxiety, -depression, -sleep difficulty           Objective:      Physical Exam  Vitals and nursing note reviewed.   Constitutional:       General: She is not in acute distress.     Appearance: She is well-developed. She is not ill-appearing.   HENT:      Head: Normocephalic and atraumatic.   Eyes:      Pupils: Pupils are equal, round, and reactive to light.   Cardiovascular:      Rate and Rhythm: Normal rate.   Pulmonary:      Effort: Pulmonary effort is normal. No respiratory distress.   Abdominal:      Palpations: Abdomen is soft.      Tenderness: There is no abdominal tenderness.   Musculoskeletal:      Cervical back: Normal range of motion.      Comments: Ambulates with cane.    Skin:     General: Skin is warm and dry.   Neurological:      Mental Status: She is alert and oriented to person, place, and time.      Coordination: Coordination normal.   Psychiatric:         Mood and Affect: Mood normal.         Behavior: Behavior normal.         Thought Content: Thought content normal.         Judgment: Judgment normal.         Assessment:       Problem List Items Addressed This Visit       History of recurrent UTIs    Relevant Orders    Urine Culture High Risk    Cystitis cystica    Relevant Orders    Urine Culture High Risk     Other Visit Diagnoses         Post-operative state    -  Primary    Relevant Orders    Urine Culture High Risk      Urinary urgency        Relevant Medications    vibegron 75 mg Tab    Other Relevant Orders    Urine Culture High Risk      Urge urinary incontinence        Relevant Medications    vibegron 75 mg Tab    Other Relevant Orders    Urine Culture High Risk      Nocturia        Relevant Medications    vibegron 75 mg Tab    Other Relevant Orders    Urine Culture High Risk      Enuresis        Relevant Medications    vibegron 75 mg Tab    Other Relevant Orders    Urine Culture High Risk            Plan:           Ricky was seen today for post-op  evaluation.    Diagnoses and all orders for this visit:    Post-operative state  -     Urine Culture High Risk    Cystitis cystica  -     Urine Culture High Risk    History of recurrent UTIs  -     Urine Culture High Risk    Urinary urgency  -     vibegron 75 mg Tab; Take 1 tablet (75 mg total) by mouth Daily.  -     Urine Culture High Risk    Urge urinary incontinence  -     vibegron 75 mg Tab; Take 1 tablet (75 mg total) by mouth Daily.  -     Urine Culture High Risk    Nocturia  -     vibegron 75 mg Tab; Take 1 tablet (75 mg total) by mouth Daily.  -     Urine Culture High Risk    Enuresis  -     vibegron 75 mg Tab; Take 1 tablet (75 mg total) by mouth Daily.  -     Urine Culture High Risk    Other order  Start trial of vibegron (Gemtesa) 75 mg daily for overactive bladder symptoms.     Follow-up in 2 weeks via telephone encounter.     This note was generated with the assistance of ambient listening technology. Verbal consent was obtained by the patient and accompanying visitor(s) for the recording of patient appointment to facilitate this note. I attest to having reviewed and edited the generated note for accuracy, though some syntax or spelling errors may persist. Please contact the author of this note for any clarification.      Lisa Dorantes, DNP

## 2025-05-30 LAB — BACTERIA UR CULT: ABNORMAL

## 2025-06-03 ENCOUNTER — PATIENT MESSAGE (OUTPATIENT)
Dept: UROLOGY | Facility: CLINIC | Age: 85
End: 2025-06-03
Payer: MEDICARE

## 2025-06-03 ENCOUNTER — RESULTS FOLLOW-UP (OUTPATIENT)
Dept: UROLOGY | Facility: CLINIC | Age: 85
End: 2025-06-03

## 2025-06-03 DIAGNOSIS — Z88.1 ALLERGY TO MULTIPLE ANTIBIOTICS: ICD-10-CM

## 2025-06-03 DIAGNOSIS — N30.01 ACUTE CYSTITIS WITH HEMATURIA: Primary | ICD-10-CM

## 2025-06-03 RX ORDER — MEROPENEM AND SODIUM CHLORIDE 1 G/50ML
1 INJECTION, SOLUTION INTRAVENOUS EVERY 12 HOURS
Qty: 700 ML | Refills: 0 | Status: SHIPPED | OUTPATIENT
Start: 2025-06-03 | End: 2025-06-03

## 2025-06-03 RX ORDER — MEROPENEM 500 MG/1
1 INJECTION, POWDER, FOR SOLUTION INTRAVENOUS 2 TIMES DAILY
Qty: 14 G | Refills: 0 | Status: SHIPPED | OUTPATIENT
Start: 2025-06-03 | End: 2025-06-03 | Stop reason: CLARIF

## 2025-06-03 RX ORDER — MEROPENEM AND SODIUM CHLORIDE 1 G/50ML
1 INJECTION, SOLUTION INTRAVENOUS EVERY 12 HOURS
Qty: 700 ML | Refills: 0 | Status: SHIPPED | OUTPATIENT
Start: 2025-06-03 | End: 2025-06-10

## 2025-06-04 ENCOUNTER — TELEPHONE (OUTPATIENT)
Dept: UROLOGY | Facility: CLINIC | Age: 85
End: 2025-06-04
Payer: MEDICARE

## 2025-06-09 ENCOUNTER — PATIENT MESSAGE (OUTPATIENT)
Dept: UROLOGY | Facility: CLINIC | Age: 85
End: 2025-06-09
Payer: MEDICARE

## 2025-06-13 ENCOUNTER — OFFICE VISIT (OUTPATIENT)
Dept: UROLOGY | Facility: CLINIC | Age: 85
End: 2025-06-13
Payer: MEDICARE

## 2025-06-13 DIAGNOSIS — Z87.440 HISTORY OF RECURRENT UTIS: ICD-10-CM

## 2025-06-13 DIAGNOSIS — R39.15 URINARY URGENCY: ICD-10-CM

## 2025-06-13 DIAGNOSIS — N30.01 ACUTE CYSTITIS WITH HEMATURIA: Primary | ICD-10-CM

## 2025-06-13 DIAGNOSIS — N39.41 URGE URINARY INCONTINENCE: ICD-10-CM

## 2025-06-13 DIAGNOSIS — R35.1 NOCTURIA: ICD-10-CM

## 2025-06-13 NOTE — PROGRESS NOTES
Audio Only Telehealth Visit     The patient location is: Home ([place])  The chief complaint leading to consultation is: 2 weeks  Visit type: Virtual visit with audio only (telephone)  Total time spent in medical discussion with patient: 10 minutes  Total time spent on date of the encounter:20 minutes       The reason for the audio only service rather than synchronous audio and video virtual visit was related to technical difficulties or patient preference/necessity.       Each patient to whom I provide medical services by telemedicine is:  (1) informed of the relationship between the physician and patient and the respective role of any other health care provider with respect to management of the patient; and (2) notified that they may decline to receive medical services by telemedicine and may withdraw from such care at any time. Patient verbally consented to receive this service via voice-only telephone call.       HPI: Patient is present via telephone encounter with her daughter (Hetal) present for her 2 week f/u since being started on vibegron 75 mg daily ad being treated for a UTI with outpatient IV antibiotic therapy with [facility]. Patient reports feeling much better. However, she is still experiencing urinary urgency and leakage when she gets up from sitting to try and make it to the restroom. She is also experiencing nocturia.      Assessment and plan:    Diagnoses and all orders for this visit:    Acute cystitis with hematuria  -     Urine Culture High Risk ($$); Future (if symptoms restart).    Urinary urgency    Urge urinary incontinence    Nocturia    History of recurrent UTIs    Other orders  Continue taking methenamine (Hiprex) 1 gram twice daily for UTI prevention.  Change vibegron 75 mg daily to nightly in hopes of better management of nocturia.     Follow-up via telephone encounter on 7/3/2025 at 11 am. Appt can be adjusted to face-to-face if patient request due to change in urology  medical problems.       Lisa Dorantes, SMITHA                     This service was not originating from a related E/M service provided within the previous 7 days nor will  to an E/M service or procedure within the next 24 hours or my soonest available appointment.  Prevailing standard of care was able to be met in this audio-only visit.

## 2025-06-24 ENCOUNTER — PATIENT MESSAGE (OUTPATIENT)
Dept: UROLOGY | Facility: CLINIC | Age: 85
End: 2025-06-24
Payer: MEDICARE

## 2025-07-28 ENCOUNTER — PATIENT MESSAGE (OUTPATIENT)
Dept: UROLOGY | Facility: CLINIC | Age: 85
End: 2025-07-28
Payer: MEDICARE

## 2025-07-28 DIAGNOSIS — Z87.440 HISTORY OF RECURRENT UTIS: Primary | ICD-10-CM

## 2025-07-28 RX ORDER — METHENAMINE HIPPURATE 1000 MG/1
1 TABLET ORAL 2 TIMES DAILY
Qty: 60 TABLET | Refills: 11 | Status: SHIPPED | OUTPATIENT
Start: 2025-07-28 | End: 2026-07-28